# Patient Record
Sex: FEMALE | Race: WHITE | NOT HISPANIC OR LATINO | Employment: FULL TIME | URBAN - NONMETROPOLITAN AREA
[De-identification: names, ages, dates, MRNs, and addresses within clinical notes are randomized per-mention and may not be internally consistent; named-entity substitution may affect disease eponyms.]

---
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
## 2020-02-26 ENCOUNTER — OFFICE VISIT (OUTPATIENT)
Dept: FAMILY MEDICINE CLINIC | Facility: CLINIC | Age: 20
End: 2020-02-26

## 2020-02-26 ENCOUNTER — LAB (OUTPATIENT)
Dept: LAB | Facility: OTHER | Age: 20
End: 2020-02-26

## 2020-02-26 VITALS
DIASTOLIC BLOOD PRESSURE: 68 MMHG | OXYGEN SATURATION: 98 % | WEIGHT: 111.6 LBS | HEIGHT: 64 IN | HEART RATE: 89 BPM | BODY MASS INDEX: 19.05 KG/M2 | SYSTOLIC BLOOD PRESSURE: 116 MMHG | TEMPERATURE: 98.6 F

## 2020-02-26 DIAGNOSIS — R21 RASH: ICD-10-CM

## 2020-02-26 DIAGNOSIS — R19.7 DIARRHEA, UNSPECIFIED TYPE: ICD-10-CM

## 2020-02-26 DIAGNOSIS — R51.9 HEADACHE DISORDER: Primary | ICD-10-CM

## 2020-02-26 LAB
ALBUMIN SERPL-MCNC: 4.3 G/DL (ref 3.5–5)
ALBUMIN/GLOB SERPL: 1.3 G/DL (ref 1.1–1.8)
ALP SERPL-CCNC: 70 U/L (ref 38–126)
ALT SERPL W P-5'-P-CCNC: 12 U/L
ANION GAP SERPL CALCULATED.3IONS-SCNC: 13 MMOL/L (ref 5–15)
AST SERPL-CCNC: 18 U/L (ref 14–36)
BASOPHILS # BLD AUTO: 0.03 10*3/MM3 (ref 0–0.2)
BASOPHILS NFR BLD AUTO: 0.5 % (ref 0–1.5)
BILIRUB SERPL-MCNC: 0.5 MG/DL (ref 0.2–1.3)
BUN BLD-MCNC: 9 MG/DL (ref 7–23)
BUN/CREAT SERPL: 11.3 (ref 7–25)
CALCIUM SPEC-SCNC: 9.6 MG/DL (ref 8.4–10.2)
CHLORIDE SERPL-SCNC: 107 MMOL/L (ref 101–112)
CO2 SERPL-SCNC: 23 MMOL/L (ref 22–30)
CREAT BLD-MCNC: 0.8 MG/DL (ref 0.52–1.04)
DEPRECATED RDW RBC AUTO: 39.8 FL (ref 37–54)
EOSINOPHIL # BLD AUTO: 0.09 10*3/MM3 (ref 0–0.4)
EOSINOPHIL NFR BLD AUTO: 1.4 % (ref 0.3–6.2)
ERYTHROCYTE [DISTWIDTH] IN BLOOD BY AUTOMATED COUNT: 11.9 % (ref 12.3–15.4)
ERYTHROCYTE [SEDIMENTATION RATE] IN BLOOD: 7 MM/HR (ref 0–20)
GFR SERPL CREATININE-BSD FRML MDRD: 92 ML/MIN/1.73 (ref 71–165)
GLOBULIN UR ELPH-MCNC: 3.4 GM/DL (ref 2.3–3.5)
GLUCOSE BLD-MCNC: 87 MG/DL (ref 70–99)
HCT VFR BLD AUTO: 38.9 % (ref 34–46.6)
HGB BLD-MCNC: 13 G/DL (ref 12–15.9)
LYMPHOCYTES # BLD AUTO: 2.25 10*3/MM3 (ref 0.7–3.1)
LYMPHOCYTES NFR BLD AUTO: 34.3 % (ref 19.6–45.3)
MCH RBC QN AUTO: 31.1 PG (ref 26.6–33)
MCHC RBC AUTO-ENTMCNC: 33.4 G/DL (ref 31.5–35.7)
MCV RBC AUTO: 93.1 FL (ref 79–97)
MONOCYTES # BLD AUTO: 0.68 10*3/MM3 (ref 0.1–0.9)
MONOCYTES NFR BLD AUTO: 10.4 % (ref 5–12)
NEUTROPHILS # BLD AUTO: 3.51 10*3/MM3 (ref 1.7–7)
NEUTROPHILS NFR BLD AUTO: 53.4 % (ref 42.7–76)
PLATELET # BLD AUTO: 254 10*3/MM3 (ref 140–450)
PMV BLD AUTO: 10.5 FL (ref 6–12)
POTASSIUM BLD-SCNC: 3.7 MMOL/L (ref 3.4–5)
PROT SERPL-MCNC: 7.7 G/DL (ref 6.3–8.6)
RBC # BLD AUTO: 4.18 10*6/MM3 (ref 3.77–5.28)
RHEUMATOID FACT SERPL-ACNC: NEGATIVE [IU]/ML
SODIUM BLD-SCNC: 143 MMOL/L (ref 137–145)
WBC NRBC COR # BLD: 6.56 10*3/MM3 (ref 3.4–10.8)

## 2020-02-26 PROCEDURE — 86003 ALLG SPEC IGE CRUDE XTRC EA: CPT | Performed by: FAMILY MEDICINE

## 2020-02-26 PROCEDURE — 82784 ASSAY IGA/IGD/IGG/IGM EACH: CPT | Performed by: FAMILY MEDICINE

## 2020-02-26 PROCEDURE — 82306 VITAMIN D 25 HYDROXY: CPT | Performed by: FAMILY MEDICINE

## 2020-02-26 PROCEDURE — 86038 ANTINUCLEAR ANTIBODIES: CPT | Performed by: FAMILY MEDICINE

## 2020-02-26 PROCEDURE — 85025 COMPLETE CBC W/AUTO DIFF WBC: CPT | Performed by: FAMILY MEDICINE

## 2020-02-26 PROCEDURE — 86255 FLUORESCENT ANTIBODY SCREEN: CPT | Performed by: FAMILY MEDICINE

## 2020-02-26 PROCEDURE — 86008 ALLG SPEC IGE RECOMB EA: CPT | Performed by: FAMILY MEDICINE

## 2020-02-26 PROCEDURE — 83516 IMMUNOASSAY NONANTIBODY: CPT | Performed by: FAMILY MEDICINE

## 2020-02-26 PROCEDURE — 85651 RBC SED RATE NONAUTOMATED: CPT | Performed by: FAMILY MEDICINE

## 2020-02-26 PROCEDURE — 80053 COMPREHEN METABOLIC PANEL: CPT | Performed by: FAMILY MEDICINE

## 2020-02-26 PROCEDURE — 99213 OFFICE O/P EST LOW 20 MIN: CPT | Performed by: FAMILY MEDICINE

## 2020-02-26 PROCEDURE — 86430 RHEUMATOID FACTOR TEST QUAL: CPT | Performed by: FAMILY MEDICINE

## 2020-02-26 NOTE — PROGRESS NOTES
Subjective   Mercy Keenan is a 19 y.o. female who presents to the office to establish care.  Her mom is with her today.  She has a few concerns.  First of all she has been having some GI issues.  For 3 to 4 weeks every time she eats she has diarrhea.  It is happened off and on for several years back into high school even and mother has been aware of it.  She is lost some weight however in the last month and the diarrhea has been interfering with her life.    She had a rash that is come up from time to time on her face across the nose and onto the cheeks and the nasal bridge.  Sometimes she has a rash on the skin and has had some joint pain and fatigue.  Mother is concerned about lupus.    She also has headaches that occur about at least 3 of 7 days weekly.  There is no photophobia nausea or vomiting they are achy and generalized in all over.  They are worse with stress.    Diarrhea    This is a recurrent problem. The current episode started 1 to 4 weeks ago. The problem occurs 2 to 4 times per day. The problem has been gradually worsening. The stool consistency is described as watery. The patient states that diarrhea does not awaken her from sleep. Associated symptoms include abdominal pain, bloating, headaches and weight loss. Pertinent negatives include no myalgias. The symptoms are aggravated by stress (eating). There are no known risk factors. She has tried change of diet for the symptoms. The treatment provided no relief.        The following portions of the patient's history were reviewed and updated as appropriate: allergies, current medications, past family history, past medical history, past social history, past surgical history and problem list.    Review of Systems   Constitutional: Positive for weight loss.   Respiratory: Negative.  Negative for shortness of breath.    Cardiovascular: Negative.  Negative for chest pain.   Gastrointestinal: Positive for abdominal pain, bloating and diarrhea.  "  Musculoskeletal: Negative.  Negative for myalgias.   Skin: Negative.    Allergic/Immunologic: Negative for immunocompromised state.   Neurological: Positive for headaches. Negative for dizziness, tremors, seizures, syncope, weakness and numbness.   Hematological: Negative.    Psychiatric/Behavioral: Negative for agitation, confusion, dysphoric mood and sleep disturbance. The patient is not nervous/anxious.    All other systems reviewed and are negative.      Objective    Vitals:    02/26/20 0856   BP: 116/68   Pulse: 89   Temp: 98.6 °F (37 °C)   TempSrc: Oral   SpO2: 98%   Weight: 50.6 kg (111 lb 9.6 oz)   Height: 162.6 cm (64\")     Body mass index is 19.16 kg/m².    Physical Exam   Constitutional: She is oriented to person, place, and time. She appears well-developed and well-nourished.   HENT:   Head: Normocephalic and atraumatic.   Nose: Nose normal.   Mouth/Throat: Oropharynx is clear and moist.   Eyes: Pupils are equal, round, and reactive to light. Conjunctivae and EOM are normal.   Neck: Normal range of motion. Neck supple. No JVD present. No tracheal deviation present. No thyromegaly present.   Cardiovascular: Normal rate, regular rhythm, normal heart sounds and intact distal pulses.   No murmur heard.  Pulmonary/Chest: Effort normal and breath sounds normal. She has no wheezes.   Abdominal: Soft. Bowel sounds are normal. She exhibits no distension. There is no tenderness.   Musculoskeletal: Normal range of motion. She exhibits no edema.   Lymphadenopathy:     She has no cervical adenopathy.   Neurological: She is alert and oriented to person, place, and time. Coordination normal.   Skin: Skin is warm and dry. No rash noted.   Psychiatric: She has a normal mood and affect.   Nursing note and vitals reviewed.      Assessment/Plan   Mercy was seen today for establish care.    Diagnoses and all orders for this visit:    Headache disorder    Diarrhea, unspecified type  -     Allergen Food Panel; Future  -   "   Alpha - Gal Panel; Future  -     Celiac Comprehensive Panel; Future  -     Comprehensive Metabolic Panel  -     CBC & Differential; Future  -     Vitamin D 25 Hydroxy    Rash  -     Rheumatoid Factor  -     Sedimentation Rate  -     NINA; Future    Due to the rash and fatigue we will get labs as above including NINA sed rate and rheumatoid factor    Suspect headaches are tension in nature.  She has been under a tremendous amount of stress.  Start magnesium 400 mg daily, and recommend 2 extra trying Tylenol when needed for the headache.  If they change in nature become more frequent or more severe let me know and we will consider CT or further neurologic work-up.    Strongly suspect she has irritable bowel.  Runs in her family with her mother and uncle with it.  We will get labs however to rule out a malabsorption or gastrointestinal intolerance or allergy.  Recommend probiotics and some diet changes and see me back for follow-up on the labs are done in approximately 2 weeks.  If still consistent with irritable bowel syndrome may need to consider treatment for anxiety.        This document has been electronically signed by Betty Gautam MD on February 26, 2020 4:49 PM

## 2020-02-27 LAB — 25(OH)D3 SERPL-MCNC: 43.1 NG/ML (ref 30–100)

## 2020-02-28 LAB — ANA SER QL: NEGATIVE

## 2020-02-29 LAB
ENDOMYSIUM IGA SER QL: POSITIVE
GLIADIN PEPTIDE IGA SER-ACNC: 20 UNITS (ref 0–19)
GLIADIN PEPTIDE IGG SER-ACNC: 27 UNITS (ref 0–19)
IGA SERPL-MCNC: 146 MG/DL (ref 87–352)
TTG IGA SER-ACNC: 18 U/ML (ref 0–3)
TTG IGG SER-ACNC: 8 U/ML (ref 0–5)

## 2020-03-01 LAB
BARLEY IGE QN: <0.1 KU/L
BEEF IGE QN: <0.1 KU/L
CABBAGE IGE QN: <0.1 KU/L
CARROT IGE QN: <0.1 KU/L
CHICKEN MEAT IGE QN: <0.1 KU/L
CODFISH IGE QN: <0.1 KU/L
CONV CLASS DESCRIPTION: NORMAL
CORN IGE QN: <0.1 KU/L
COW MILK IGE QN: <0.1 KU/L
CRAB IGE QN: <0.1 KU/L
EGG WHITE IGE QN: <0.1 KU/L
GRAPE IGE QN: <0.1 KU/L
GREEN PEPPER IGE QN: <0.1 KU/L
LETTUCE IGE QN: <0.1 KU/L
OAT IGE QN: <0.1 KU/L
ORANGE IGE QN: <0.1 KU/L
PEANUT IGE QN: <0.1 KU/L
PORK IGE QN: <0.1 KU/L
POTATO IGE QN: <0.1 KU/L
RICE IGE QN: <0.1 KU/L
RYE IGE: <0.1 KU/L
SHRIMP IGE: <0.1 KU/L
SOYBEAN IGE QN: <0.1 KU/L
TOMATO IGE QN: <0.1 KU/L
TUNA IGE QN: <0.1 KU/L
WHEAT IGE QN: <0.1 KU/L
WHITE BEAN IGE QN: <0.1 KU/L

## 2020-03-03 LAB
ALPHA GAL IGE: <0.1 KU/L
BEEF IGE QN: <0.1 KU/L
LAMB IGE QN: <0.1 KU/L
Lab: 0
PORK IGE: <0.1 KU/L

## 2020-03-20 RX ORDER — OMEPRAZOLE 40 MG/1
40 CAPSULE, DELAYED RELEASE ORAL DAILY
Qty: 30 CAPSULE | Refills: 5 | Status: SHIPPED | OUTPATIENT
Start: 2020-03-20 | End: 2020-08-24

## 2020-03-25 ENCOUNTER — OFFICE VISIT (OUTPATIENT)
Dept: FAMILY MEDICINE CLINIC | Facility: CLINIC | Age: 20
End: 2020-03-25

## 2020-03-25 VITALS — BODY MASS INDEX: 18.95 KG/M2 | WEIGHT: 111 LBS | HEIGHT: 64 IN

## 2020-03-25 DIAGNOSIS — K90.41: Primary | ICD-10-CM

## 2020-03-25 PROCEDURE — 99213 OFFICE O/P EST LOW 20 MIN: CPT | Performed by: FAMILY MEDICINE

## 2020-03-25 NOTE — PROGRESS NOTES
"Subjective   Mercy Patti Keenan is a 19 y.o. female who presents to the office today for follow-up on diarrhea and GI issues.  Recent testing showed gluten intolerance.  She has been following a gluten-free diet for couple weeks and is starting to have some improvement.  She has less diarrhea and cramping overall.    Diarrhea    This is a recurrent problem. The current episode started 1 to 4 weeks ago. The problem occurs 2 to 4 times per day. The problem has been gradually worsening. The stool consistency is described as watery. The patient states that diarrhea does not awaken her from sleep. Associated symptoms include abdominal pain, bloating, headaches and weight loss. Pertinent negatives include no myalgias. The symptoms are aggravated by stress (eating). There are no known risk factors. She has tried change of diet for the symptoms. The treatment provided no relief.        The following portions of the patient's history were reviewed and updated as appropriate: allergies, current medications, past family history, past medical history, past social history, past surgical history and problem list.    Review of Systems   Constitutional: Positive for weight loss.   Respiratory: Negative.  Negative for shortness of breath.    Cardiovascular: Negative.  Negative for chest pain.   Gastrointestinal: Positive for abdominal pain, bloating and diarrhea.   Musculoskeletal: Negative.  Negative for myalgias.   Skin: Negative.    Allergic/Immunologic: Negative for immunocompromised state.   Neurological: Positive for headaches. Negative for dizziness, tremors, seizures, syncope, weakness and numbness.   Hematological: Negative.    Psychiatric/Behavioral: Negative for agitation, confusion, dysphoric mood and sleep disturbance. The patient is not nervous/anxious.    All other systems reviewed and are negative.      Objective    Vitals:    03/25/20 0759   Weight: 50.3 kg (111 lb)   Height: 162.6 cm (64\")     Body mass index " is 19.05 kg/m².    Physical Exam   Constitutional: She is oriented to person, place, and time. She appears well-developed and well-nourished.   HENT:   Head: Normocephalic and atraumatic.   Nose: Nose normal.   Mouth/Throat: Oropharynx is clear and moist.   Eyes: Pupils are equal, round, and reactive to light. Conjunctivae and EOM are normal.   Neck: Normal range of motion. Neck supple. No JVD present. No tracheal deviation present. No thyromegaly present.   Cardiovascular: Normal rate, regular rhythm, normal heart sounds and intact distal pulses.   No murmur heard.  Pulmonary/Chest: Effort normal and breath sounds normal. She has no wheezes.   Abdominal: Soft. Bowel sounds are normal. She exhibits no distension. There is no tenderness.   Musculoskeletal: Normal range of motion. She exhibits no edema.   Lymphadenopathy:     She has no cervical adenopathy.   Neurological: She is alert and oriented to person, place, and time. Coordination normal.   Skin: Skin is warm and dry. No rash noted.   Psychiatric: She has a normal mood and affect.   Nursing note and vitals reviewed.    Lab on 02/26/2020   Component Date Value Ref Range Status   • Class Description 02/26/2020 Comment   Final        Levels of Specific IgE       Class  Description of Class      ---------------------------  -----  --------------------                     < 0.10         0         Negative             0.10 -    0.31         0/I       Equivocal/Low             0.32 -    0.55         I         Low             0.56 -    1.40         II        Moderate             1.41 -    3.90         III       High             3.91 -   19.00         IV        Very High            19.01 -  100.00         V         Very High                    >100.00         VI        Very High   • Egg White 02/26/2020 <0.10  Class 0 kU/L Final   • Milk, Cow's 02/26/2020 <0.10  Class 0 kU/L Final   • CodFish 02/26/2020 <0.10  Class 0 kU/L Final   • Wheat 02/26/2020 <0.10  Class 0 kU/L  Final   • [place] 02/26/2020 <0.10  Class 0 kU/L Final   • Barley 02/26/2020 <0.10  Class 0 kU/L Final   • Oats 02/26/2020 <0.10  Class 0 kU/L Final   • Corn 02/26/2020 <0.10  Class 0 kU/L Final   • Rice 02/26/2020 <0.10  Class 0 kU/L Final   • Peanut 02/26/2020 <0.10  Class 0 kU/L Final   • Soybean 02/26/2020 <0.10  Class 0 kU/L Final   • White Bean 02/26/2020 <0.10  Class 0 kU/L Final   • Crab 02/26/2020 <0.10  Class 0 kU/L Final   • Shrimp 02/26/2020 <0.10  Class 0 kU/L Final   • Tomato 02/26/2020 <0.10  Class 0 kU/L Final   • Pork 02/26/2020 <0.10  Class 0 kU/L Final   • Beef 02/26/2020 <0.10  Class 0 kU/L Final   • Carrot 02/26/2020 <0.10  Class 0 kU/L Final   • Orange 02/26/2020 <0.10  Class 0 kU/L Final   • Potato 02/26/2020 <0.10  Class 0 kU/L Final   • Tuna 02/26/2020 <0.10  Class 0 kU/L Final   • Chicken 02/26/2020 <0.10  Class 0 kU/L Final   • Green Bell Pepper 02/26/2020 <0.10  Class 0 kU/L Final   • Lettuce 02/26/2020 <0.10  Class 0 kU/L Final   • Cabbage 02/26/2020 <0.10  Class 0 kU/L Final   • Grape 02/26/2020 <0.10  Class 0 kU/L Final   • Beef 02/26/2020 <0.10  <0.35 kU/L Final   • Class Description 02/26/2020 0   Final   • Mart 02/26/2020 <0.10  <0.35 kU/L Final   • Class Interpretation 02/26/2020 0   Final   • Pork 02/26/2020 <0.10  <0.35 kU/L Final   • Class Interpretation 02/26/2020 0   Final    The test method is the [email or web address] ImmunoCAP allergen-specific  IgE system. CLASS INTERPRETATION   <0.10 kU/L= 0,  Negative; 0.10 - 0.34 kU/L= 0/1, Equivocal/Borderline;  0.35 - 0.69  kU/L=1, Low Positive; 0.70 - 3.49 kU/L=2,  Moderate Positive;  3.50  - 17.49 kU/L=3, High Positive;  17.50 - 49.99 kU/L= 4, Very High Positive; 50.00 - 99.99  kU/L= 5, Very High Positive;   >99.99 kU/L=6, Very High  Positive  *This test was developed and its performance  characteristics determined by [facility]. It has not  been cleared or approved by the [place] Food and Drug  Administration.   • Alpha Gal IgE 02/26/2020 <0.10   <0.10 kU/L Final    Previous reports (RAY [id number]) have demonstrated  that patients with IgE antibodies to  [id number],3-galactose are at risk for delayed  anaphylaxis, angioedema, or urticaria following  consumption of beef, pork, or lamb.   • Gliadin Deamidated Peptide Ab, IgA 02/26/2020 20* 0 - 19 units Final                       Negative                   0 - 19                     Weak Positive             20 - 30                     Moderate to Strong Positive   >30   • Deaminated Gliadin Ab IgG 02/26/2020 27* 0 - 19 units Final                       Negative                   0 - 19                     Weak Positive             20 - 30                     Moderate to Strong Positive   >30   • Tissue Transglutaminase IgA 02/26/2020 18* 0 - 3 U/mL Final                                  Negative        0 -  3                                Weak Positive   4 - 10                                Positive           >10   Tissue Transglutaminase (tTG) has been identified   as the endomysial antigen.  Studies have demonstr-   ated that endomysial IgA antibodies have over 99%   specificity for gluten sensitive enteropathy.   • Tissue Transglutaminase IgG 02/26/2020 8* 0 - 5 U/mL Final                                  Negative        0 - 5                                Weak Positive   6 - 9                                Positive           >9   • Endomysial IgA 02/26/2020 Positive* Negative Final   • IgA 02/26/2020 146  87 - 352 mg/dL Final   • NINA Direct 02/26/2020 Negative  Negative Final   • WBC 02/26/2020 6.56  3.40 - 10.80 10*3/mm3 Final   • RBC 02/26/2020 4.18  3.77 - 5.28 10*6/mm3 Final   • Hemoglobin 02/26/2020 13.0  12.0 - 15.9 g/dL Final   • Hematocrit 02/26/2020 38.9  34.0 - 46.6 % Final   • MCV 02/26/2020 93.1  79.0 - 97.0 fL Final   • MCH 02/26/2020 31.1  26.6 - 33.0 pg Final   • MCHC 02/26/2020 33.4  31.5 - 35.7 g/dL Final   • RDW 02/26/2020 11.9* 12.3 - 15.4 % Final   • RDW-SD 02/26/2020  39.8  37.0 - 54.0 fl Final   • MPV 02/26/2020 10.5  6.0 - 12.0 fL Final   • Platelets 02/26/2020 254  140 - 450 10*3/mm3 Final   • Neutrophil % 02/26/2020 53.4  42.7 - 76.0 % Final   • Lymphocyte % 02/26/2020 34.3  19.6 - 45.3 % Final   • Monocyte % 02/26/2020 10.4  5.0 - 12.0 % Final   • Eosinophil % 02/26/2020 1.4  0.3 - 6.2 % Final   • Basophil % 02/26/2020 0.5  0.0 - 1.5 % Final   • Neutrophils, Absolute 02/26/2020 3.51  1.70 - 7.00 10*3/mm3 Final   • Lymphocytes, Absolute 02/26/2020 2.25  0.70 - 3.10 10*3/mm3 Final   • Monocytes, Absolute 02/26/2020 0.68  0.10 - 0.90 10*3/mm3 Final   • Eosinophils, Absolute 02/26/2020 0.09  0.00 - 0.40 10*3/mm3 Final   • Basophils, Absolute 02/26/2020 0.03  0.00 - 0.20 10*3/mm3 Final   Office Visit on 02/26/2020   Component Date Value Ref Range Status   • Rheumatoid Factor Qualitative 02/26/2020 Negative  Negative Final   • Sed Rate 02/26/2020 7  0 - 20 mm/hr Final   • Glucose 02/26/2020 87  70 - 99 mg/dL Final   • BUN 02/26/2020 9  7 - 23 mg/dL Final   • Creatinine 02/26/2020 0.80  0.52 - 1.04 mg/dL Final   • Sodium 02/26/2020 143  137 - 145 mmol/L Final   • Potassium 02/26/2020 3.7  3.4 - 5.0 mmol/L Final   • Chloride 02/26/2020 107  101 - 112 mmol/L Final   • CO2 02/26/2020 23.0  22.0 - 30.0 mmol/L Final   • Calcium 02/26/2020 9.6  8.4 - 10.2 mg/dL Final   • Total Protein 02/26/2020 7.7  6.3 - 8.6 g/dL Final   • Albumin 02/26/2020 4.30  3.50 - 5.00 g/dL Final   • ALT (SGPT) 02/26/2020 12  <=35 U/L Final   • AST (SGOT) 02/26/2020 18  14 - 36 U/L Final   • Alkaline Phosphatase 02/26/2020 70  38 - 126 U/L Final   • Total Bilirubin 02/26/2020 0.5  0.2 - 1.3 mg/dL Final   • eGFR Non African Amer 02/26/2020 92  71 - 165 mL/min/1.73 Final   • Globulin 02/26/2020 3.4  2.3 - 3.5 gm/dL Final   • A/G Ratio 02/26/2020 1.3  1.1 - 1.8 g/dL Final   • BUN/Creatinine Ratio 02/26/2020 11.3  7.0 - 25.0 Final   • Anion Gap 02/26/2020 13.0  5.0 - 15.0 mmol/L Final   • 25 Hydroxy, Vitamin D  02/26/2020 43.1  30.0 - 100.0 ng/ml Final     Assessment/Plan   Mercy was seen today for follow-up.    Diagnoses and all orders for this visit:    Gluten-induced enteropathy    Reviewed gluten-free diet issues with her.  Right now she is feeling like symptoms are gradually improving since she has been following the gluten-free diet.  Advised to continue this and if symptoms recur consider referral to GI for endoscopy.  Reviewed labs, as above.  Recheck in 3 months.        This document has been electronically signed by Betty Gautam MD on March 25, 2020 08:12

## 2020-04-15 RX ORDER — FLUOXETINE HYDROCHLORIDE 20 MG/1
20 CAPSULE ORAL DAILY
Qty: 30 CAPSULE | Refills: 5 | Status: SHIPPED | OUTPATIENT
Start: 2020-04-15 | End: 2020-07-14 | Stop reason: DRUGHIGH

## 2020-04-16 DIAGNOSIS — R10.84 GENERALIZED ABDOMINAL PAIN: Primary | ICD-10-CM

## 2020-05-01 ENCOUNTER — OFFICE VISIT (OUTPATIENT)
Dept: GASTROENTEROLOGY | Facility: CLINIC | Age: 20
End: 2020-05-01

## 2020-05-01 VITALS
DIASTOLIC BLOOD PRESSURE: 80 MMHG | BODY MASS INDEX: 18.37 KG/M2 | HEART RATE: 76 BPM | WEIGHT: 107.6 LBS | HEIGHT: 64 IN | SYSTOLIC BLOOD PRESSURE: 117 MMHG

## 2020-05-01 DIAGNOSIS — R10.13 EPIGASTRIC PAIN: ICD-10-CM

## 2020-05-01 DIAGNOSIS — R19.4 CHANGE IN BOWEL HABITS: Primary | ICD-10-CM

## 2020-05-01 PROCEDURE — 99204 OFFICE O/P NEW MOD 45 MIN: CPT | Performed by: INTERNAL MEDICINE

## 2020-05-01 RX ORDER — DEXTROSE AND SODIUM CHLORIDE 5; .45 G/100ML; G/100ML
30 INJECTION, SOLUTION INTRAVENOUS CONTINUOUS PRN
Status: CANCELLED | OUTPATIENT
Start: 2020-05-08

## 2020-05-01 NOTE — PROGRESS NOTES
[facility]      Chief Complaint:   Chief Complaint   Patient presents with   • Abdominal Pain   • Diarrhea   • Celiac Disease       Subjective     HPI:   Patient with recent change in bowel habits severe nature.  Patient states she has been having diarrhea and feeling as though food is going straight through after she eats.  Patient with some epigastric abdominal pain with eating too.  Patient denies seeing any blood or mucus in the stool.  Patient does not have a family history of GI problems patient's mother does have a history of colon polyps.  Patient did have a celiac test done which appears to show that she has celiac disease but patient states that despite attempts at avoiding gluten patient continues to have the symptoms somewhat improved but still consistently occurring.    Plan; we will schedule patient for EGD and colonoscopy to evaluate we will do biopsy of the small bowel to determine if there is villous atrophy and if this is the cause for patient's symptoms.    Past Medical History:   Past Medical History:   Diagnosis Date   • Anxiety        Past Surgical History:  Past Surgical History:   Procedure Laterality Date   • ADENOIDECTOMY     • TONSILLECTOMY     • WISDOM TOOTH EXTRACTION  10/2018       Family History:  Family History   Problem Relation Age of Onset   • No Known Problems Mother    • Diabetes Father    • Heart failure Father    • Hypertension Father    • Stroke Father        Social History:   reports that she has never smoked. She has never used smokeless tobacco. She reports that she does not drink alcohol or use drugs.    Medications:   Prior to Admission medications    Medication Sig Start Date End Date Taking? Authorizing Provider   drospirenone-ethinyl estradiol (JO,GIANVI) 3-0.02 MG per tablet Take 1 tablet by mouth Daily. 5/13/19  Yes Madhuri Shoemaker APRN   FLUoxetine (PROzac) 20 MG capsule Take 1 capsule by mouth Daily. 4/15/20  Yes Dain  "MD Betty   omeprazole (priLOSEC) 40 MG capsule Take 1 capsule by mouth Daily. 3/20/20  Yes Betty Gautam MD   polyethylene glycol (GoLYTELY) 236 g solution Starting at noon on day prior to procedure, drink 8 ounces every 30 minutes until all gone or stools are clear. May add flavor packet. 5/1/20   Loc Flores MD       Allergies:  Patient has no known allergies.    ROS:    Review of Systems   Constitutional: Negative for activity change, appetite change, chills, diaphoresis, fatigue, fever and unexpected weight change.   HENT: Negative for sore throat and trouble swallowing.    Respiratory: Negative for shortness of breath.    Gastrointestinal: Positive for abdominal pain, diarrhea and nausea. Negative for abdominal distention, anal bleeding, blood in stool, constipation, rectal pain and vomiting.   Endocrine: Negative for polydipsia, polyphagia and polyuria.   Genitourinary: Negative for difficulty urinating, dysuria, frequency and hematuria.   Musculoskeletal: Negative for arthralgias and myalgias.   Skin: Negative for pallor.   Allergic/Immunologic: Negative for food allergies.   Neurological: Negative for weakness, light-headedness and headaches.   Psychiatric/Behavioral: Negative for behavioral problems.     Objective     Blood pressure 117/80, pulse 76, height 162.6 cm (64\"), weight 48.8 kg (107 lb 9.6 oz), not currently breastfeeding.    Physical Exam   Constitutional: She is oriented to person, place, and time. She appears well-developed and well-nourished. No distress.   HENT:   Head: Normocephalic and atraumatic.   Cardiovascular: Normal rate, regular rhythm, normal heart sounds and intact distal pulses. Exam reveals no gallop and no friction rub.   No murmur heard.  Pulmonary/Chest: Breath sounds normal. No respiratory distress. She has no wheezes. She has no rales. She exhibits no tenderness.   Abdominal: Soft. Bowel sounds are normal. She exhibits no distension and no mass. There is no " tenderness. There is no rebound and no guarding. No hernia.   Musculoskeletal: Normal range of motion. She exhibits no edema.   Neurological: She is alert and oriented to person, place, and time.   Skin: Skin is warm and dry. No rash noted. She is not diaphoretic. No erythema. No pallor.   Psychiatric: She has a normal mood and affect. Her behavior is normal. Judgment and thought content normal.        Assessment/Plan   Mercy was seen today for abdominal pain, diarrhea and celiac disease.    Diagnoses and all orders for this visit:    Change in bowel habits  -     Case Request; Standing  -     Case Request    Epigastric pain  -     Case Request; Standing  -     Case Request    Other orders  -     Follow Anesthesia Guidelines / Standing Orders; Future  -     Obtain Informed Consent; Future  -     polyethylene glycol (GoLYTELY) 236 g solution; Starting at noon on day prior to procedure, drink 8 ounces every 30 minutes until all gone or stools are clear. May add flavor packet.        ESOPHAGOGASTRODUODENOSCOPY (N/A), COLONOSCOPY (N/A)     Diagnosis Plan   1. Change in bowel habits  Case Request    dextrose 5 % and sodium chloride 0.45 % infusion    Case Request   2. Epigastric pain  Case Request    dextrose 5 % and sodium chloride 0.45 % infusion    Case Request       Anticipated Surgical Procedure:  Orders Placed This Encounter   Procedures   • Follow Anesthesia Guidelines / Standing Orders     Standing Status:   Future   • Obtain Informed Consent     Standing Status:   Future     Order Specific Question:   Informed Consent Given For     Answer:   egd and colonoscopy       The risks, benefits, and alternatives of this procedure have been discussed with the patient or the responsible party- the patient understands and agrees to proceed.                                                                Answers for HPI/ROS submitted by the patient on 4/24/2020   Abdominal pain  What is the primary reason for your visit?:  Abdominal Pain  Chronicity: chronic  Onset: more than 1 year ago  Onset quality: sudden  Frequency: constantly  Episode duration: 3 hours  Progression since onset: gradually worsening  Pain location: generalized abdominal region  Pain - numeric: 7/10  Pain quality: cramping  Radiates to: does not radiate  anorexia: No  belching: No  flatus: No  hematochezia: No  melena: No  weight loss: Yes  Aggravated by: eating  Relieved by: bowel movements

## 2020-05-01 NOTE — H&P (VIEW-ONLY)
[facility]      Chief Complaint:   Chief Complaint   Patient presents with   • Abdominal Pain   • Diarrhea   • Celiac Disease       Subjective     HPI:   Patient with recent change in bowel habits severe nature.  Patient states she has been having diarrhea and feeling as though food is going straight through after she eats.  Patient with some epigastric abdominal pain with eating too.  Patient denies seeing any blood or mucus in the stool.  Patient does not have a family history of GI problems patient's mother does have a history of colon polyps.  Patient did have a celiac test done which appears to show that she has celiac disease but patient states that despite attempts at avoiding gluten patient continues to have the symptoms somewhat improved but still consistently occurring.    Plan; we will schedule patient for EGD and colonoscopy to evaluate we will do biopsy of the small bowel to determine if there is villous atrophy and if this is the cause for patient's symptoms.    Past Medical History:   Past Medical History:   Diagnosis Date   • Anxiety        Past Surgical History:  Past Surgical History:   Procedure Laterality Date   • ADENOIDECTOMY     • TONSILLECTOMY     • WISDOM TOOTH EXTRACTION  10/2018       Family History:  Family History   Problem Relation Age of Onset   • No Known Problems Mother    • Diabetes Father    • Heart failure Father    • Hypertension Father    • Stroke Father        Social History:   reports that she has never smoked. She has never used smokeless tobacco. She reports that she does not drink alcohol or use drugs.    Medications:   Prior to Admission medications    Medication Sig Start Date End Date Taking? Authorizing Provider   drospirenone-ethinyl estradiol (JO,GIANVI) 3-0.02 MG per tablet Take 1 tablet by mouth Daily. 5/13/19  Yes Madhuri Shoemaker APRN   FLUoxetine (PROzac) 20 MG capsule Take 1 capsule by mouth Daily. 4/15/20  Yes Dain  "MD Betty   omeprazole (priLOSEC) 40 MG capsule Take 1 capsule by mouth Daily. 3/20/20  Yes Betty Gautam MD   polyethylene glycol (GoLYTELY) 236 g solution Starting at noon on day prior to procedure, drink 8 ounces every 30 minutes until all gone or stools are clear. May add flavor packet. 5/1/20   Loc Flores MD       Allergies:  Patient has no known allergies.    ROS:    Review of Systems   Constitutional: Negative for activity change, appetite change, chills, diaphoresis, fatigue, fever and unexpected weight change.   HENT: Negative for sore throat and trouble swallowing.    Respiratory: Negative for shortness of breath.    Gastrointestinal: Positive for abdominal pain, diarrhea and nausea. Negative for abdominal distention, anal bleeding, blood in stool, constipation, rectal pain and vomiting.   Endocrine: Negative for polydipsia, polyphagia and polyuria.   Genitourinary: Negative for difficulty urinating, dysuria, frequency and hematuria.   Musculoskeletal: Negative for arthralgias and myalgias.   Skin: Negative for pallor.   Allergic/Immunologic: Negative for food allergies.   Neurological: Negative for weakness, light-headedness and headaches.   Psychiatric/Behavioral: Negative for behavioral problems.     Objective     Blood pressure 117/80, pulse 76, height 162.6 cm (64\"), weight 48.8 kg (107 lb 9.6 oz), not currently breastfeeding.    Physical Exam   Constitutional: She is oriented to person, place, and time. She appears well-developed and well-nourished. No distress.   HENT:   Head: Normocephalic and atraumatic.   Cardiovascular: Normal rate, regular rhythm, normal heart sounds and intact distal pulses. Exam reveals no gallop and no friction rub.   No murmur heard.  Pulmonary/Chest: Breath sounds normal. No respiratory distress. She has no wheezes. She has no rales. She exhibits no tenderness.   Abdominal: Soft. Bowel sounds are normal. She exhibits no distension and no mass. There is no " tenderness. There is no rebound and no guarding. No hernia.   Musculoskeletal: Normal range of motion. She exhibits no edema.   Neurological: She is alert and oriented to person, place, and time.   Skin: Skin is warm and dry. No rash noted. She is not diaphoretic. No erythema. No pallor.   Psychiatric: She has a normal mood and affect. Her behavior is normal. Judgment and thought content normal.        Assessment/Plan   Mercy was seen today for abdominal pain, diarrhea and celiac disease.    Diagnoses and all orders for this visit:    Change in bowel habits  -     Case Request; Standing  -     Case Request    Epigastric pain  -     Case Request; Standing  -     Case Request    Other orders  -     Follow Anesthesia Guidelines / Standing Orders; Future  -     Obtain Informed Consent; Future  -     polyethylene glycol (GoLYTELY) 236 g solution; Starting at noon on day prior to procedure, drink 8 ounces every 30 minutes until all gone or stools are clear. May add flavor packet.        ESOPHAGOGASTRODUODENOSCOPY (N/A), COLONOSCOPY (N/A)     Diagnosis Plan   1. Change in bowel habits  Case Request    dextrose 5 % and sodium chloride 0.45 % infusion    Case Request   2. Epigastric pain  Case Request    dextrose 5 % and sodium chloride 0.45 % infusion    Case Request       Anticipated Surgical Procedure:  Orders Placed This Encounter   Procedures   • Follow Anesthesia Guidelines / Standing Orders     Standing Status:   Future   • Obtain Informed Consent     Standing Status:   Future     Order Specific Question:   Informed Consent Given For     Answer:   egd and colonoscopy       The risks, benefits, and alternatives of this procedure have been discussed with the patient or the responsible party- the patient understands and agrees to proceed.                                                                Answers for HPI/ROS submitted by the patient on 4/24/2020   Abdominal pain  What is the primary reason for your visit?:  Abdominal Pain  Chronicity: chronic  Onset: more than 1 year ago  Onset quality: sudden  Frequency: constantly  Episode duration: 3 hours  Progression since onset: gradually worsening  Pain location: generalized abdominal region  Pain - numeric: 7/10  Pain quality: cramping  Radiates to: does not radiate  anorexia: No  belching: No  flatus: No  hematochezia: No  melena: No  weight loss: Yes  Aggravated by: eating  Relieved by: bowel movements

## 2020-05-05 PROCEDURE — U0003 INFECTIOUS AGENT DETECTION BY NUCLEIC ACID (DNA OR RNA); SEVERE ACUTE RESPIRATORY SYNDROME CORONAVIRUS 2 (SARS-COV-2) (CORONAVIRUS DISEASE [COVID-19]), AMPLIFIED PROBE TECHNIQUE, MAKING USE OF HIGH THROUGHPUT TECHNOLOGIES AS DESCRIBED BY CMS-2020-01-R: HCPCS | Performed by: INTERNAL MEDICINE

## 2020-05-07 LAB — SARS-COV-2 RNA RESP QL NAA+PROBE: NOT DETECTED

## 2020-05-08 ENCOUNTER — HOSPITAL ENCOUNTER (OUTPATIENT)
Facility: HOSPITAL | Age: 20
Setting detail: HOSPITAL OUTPATIENT SURGERY
Discharge: HOME | End: 2020-05-08
Attending: INTERNAL MEDICINE | Admitting: INTERNAL MEDICINE

## 2020-05-08 ENCOUNTER — ANESTHESIA (OUTPATIENT)
Dept: PERIOP | Facility: HOSPITAL | Age: 20
End: 2020-05-08

## 2020-05-08 ENCOUNTER — ANESTHESIA EVENT (OUTPATIENT)
Dept: PERIOP | Facility: HOSPITAL | Age: 20
End: 2020-05-08

## 2020-05-08 VITALS
HEIGHT: 55 IN | OXYGEN SATURATION: 98 % | TEMPERATURE: 97.2 F | RESPIRATION RATE: 16 BRPM | HEART RATE: 74 BPM | BODY MASS INDEX: 24.59 KG/M2 | SYSTOLIC BLOOD PRESSURE: 110 MMHG | WEIGHT: 106.26 LBS | DIASTOLIC BLOOD PRESSURE: 56 MMHG

## 2020-05-08 DIAGNOSIS — R10.13 EPIGASTRIC PAIN: ICD-10-CM

## 2020-05-08 DIAGNOSIS — R19.4 CHANGE IN BOWEL HABITS: ICD-10-CM

## 2020-05-08 LAB — B-HCG UR QL: NEGATIVE

## 2020-05-08 PROCEDURE — 25010000002 MIDAZOLAM PER 1 MG: Performed by: NURSE ANESTHETIST, CERTIFIED REGISTERED

## 2020-05-08 PROCEDURE — 88305 TISSUE EXAM BY PATHOLOGIST: CPT | Performed by: INTERNAL MEDICINE

## 2020-05-08 PROCEDURE — 43239 EGD BIOPSY SINGLE/MULTIPLE: CPT | Performed by: INTERNAL MEDICINE

## 2020-05-08 PROCEDURE — 88342 IMHCHEM/IMCYTCHM 1ST ANTB: CPT | Performed by: PATHOLOGY

## 2020-05-08 PROCEDURE — 88305 TISSUE EXAM BY PATHOLOGIST: CPT | Performed by: PATHOLOGY

## 2020-05-08 PROCEDURE — 88342 IMHCHEM/IMCYTCHM 1ST ANTB: CPT | Performed by: INTERNAL MEDICINE

## 2020-05-08 PROCEDURE — 81025 URINE PREGNANCY TEST: CPT | Performed by: INTERNAL MEDICINE

## 2020-05-08 PROCEDURE — 25010000002 PROPOFOL 10 MG/ML EMULSION: Performed by: NURSE ANESTHETIST, CERTIFIED REGISTERED

## 2020-05-08 PROCEDURE — 45380 COLONOSCOPY AND BIOPSY: CPT | Performed by: INTERNAL MEDICINE

## 2020-05-08 RX ORDER — PROPOFOL 10 MG/ML
VIAL (ML) INTRAVENOUS AS NEEDED
Status: DISCONTINUED | OUTPATIENT
Start: 2020-05-08 | End: 2020-05-08 | Stop reason: SURG

## 2020-05-08 RX ORDER — SODIUM CHLORIDE, SODIUM GLUCONATE, SODIUM ACETATE, POTASSIUM CHLORIDE, AND MAGNESIUM CHLORIDE 526; 502; 368; 37; 30 MG/100ML; MG/100ML; MG/100ML; MG/100ML; MG/100ML
INJECTION, SOLUTION INTRAVENOUS CONTINUOUS PRN
Status: DISCONTINUED | OUTPATIENT
Start: 2020-05-08 | End: 2020-05-08 | Stop reason: SURG

## 2020-05-08 RX ORDER — LIDOCAINE HYDROCHLORIDE 20 MG/ML
INJECTION, SOLUTION EPIDURAL; INFILTRATION; INTRACAUDAL; PERINEURAL AS NEEDED
Status: DISCONTINUED | OUTPATIENT
Start: 2020-05-08 | End: 2020-05-08 | Stop reason: SURG

## 2020-05-08 RX ORDER — DEXTROSE AND SODIUM CHLORIDE 5; .45 G/100ML; G/100ML
30 INJECTION, SOLUTION INTRAVENOUS CONTINUOUS PRN
Status: DISCONTINUED | OUTPATIENT
Start: 2020-05-08 | End: 2020-05-08 | Stop reason: HOSPADM

## 2020-05-08 RX ORDER — MIDAZOLAM HYDROCHLORIDE 1 MG/ML
INJECTION INTRAMUSCULAR; INTRAVENOUS AS NEEDED
Status: DISCONTINUED | OUTPATIENT
Start: 2020-05-08 | End: 2020-05-08 | Stop reason: SURG

## 2020-05-08 RX ADMIN — PROPOFOL 30 MG: 10 INJECTION, EMULSION INTRAVENOUS at 10:42

## 2020-05-08 RX ADMIN — PROPOFOL 30 MG: 10 INJECTION, EMULSION INTRAVENOUS at 10:44

## 2020-05-08 RX ADMIN — PROPOFOL 20 MG: 10 INJECTION, EMULSION INTRAVENOUS at 10:50

## 2020-05-08 RX ADMIN — DEXTROSE AND SODIUM CHLORIDE 30 ML/HR: 5; 450 INJECTION, SOLUTION INTRAVENOUS at 09:45

## 2020-05-08 RX ADMIN — SODIUM CHLORIDE, SODIUM GLUCONATE, SODIUM ACETATE, POTASSIUM CHLORIDE, AND MAGNESIUM CHLORIDE: 526; 502; 368; 37; 30 INJECTION, SOLUTION INTRAVENOUS at 10:52

## 2020-05-08 RX ADMIN — MIDAZOLAM HYDROCHLORIDE 2 MG: 2 INJECTION, SOLUTION INTRAMUSCULAR; INTRAVENOUS at 10:37

## 2020-05-08 RX ADMIN — LIDOCAINE HYDROCHLORIDE 50 MG: 20 INJECTION, SOLUTION EPIDURAL; INFILTRATION; INTRACAUDAL; PERINEURAL at 10:39

## 2020-05-08 RX ADMIN — PROPOFOL 30 MG: 10 INJECTION, EMULSION INTRAVENOUS at 10:47

## 2020-05-08 RX ADMIN — PROPOFOL 80 MG: 10 INJECTION, EMULSION INTRAVENOUS at 10:39

## 2020-05-08 NOTE — ANESTHESIA PREPROCEDURE EVALUATION
Anesthesia Evaluation                  Airway   Mallampati: I  Dental      Pulmonary    (-) sleep apnea, not a smoker    ROS comment: Negative patient screen for JARROD    Cardiovascular         Neuro/Psych  (+) psychiatric history Anxiety,     GI/Hepatic/Renal/Endo    (+)  GERD,      Musculoskeletal     Abdominal    Substance History      OB/GYN          Other                      Anesthesia Plan    ASA 1     MAC       Anesthetic plan, all risks, benefits, and alternatives have been provided, discussed and informed consent has been obtained with: patient.

## 2020-05-08 NOTE — ANESTHESIA POSTPROCEDURE EVALUATION
Patient: Mercy Keenan    Procedure Summary     Date:  05/08/20 Room / Location:  [facility] OR 08 / [facility]    Anesthesia Start:  1036 Anesthesia Stop:  1056    Procedures:       ESOPHAGOGASTRODUODENOSCOPY (N/A )      COLONOSCOPY (N/A ) Diagnosis:       Change in bowel habits      Epigastric pain      (Change in bowel habits [R19.4])      (Epigastric pain [R10.13])    Surgeon:  Loc Flores MD Provider:  Mustapha Cline MD    Anesthesia Type:  MAC ASA Status:  1          Anesthesia Type: MAC    Vitals  No vitals data found for the desired time range.          Post Anesthesia Care and Evaluation    Patient location during evaluation: bedside  Patient participation: complete - patient participated  Level of consciousness: awake and awake and alert  Pain score: 0  Pain management: satisfactory to patient  Airway patency: patent  Anesthetic complications: No anesthetic complications  PONV Status: none  Cardiovascular status: acceptable and stable  Respiratory status: acceptable, room air and spontaneous ventilation  Hydration status: acceptable

## 2020-05-12 DIAGNOSIS — Z30.41 ORAL CONTRACEPTIVE PILL SURVEILLANCE: ICD-10-CM

## 2020-05-12 DIAGNOSIS — N92.0 EXCESSIVE AND FREQUENT MENSTRUATION WITH REGULAR CYCLE: ICD-10-CM

## 2020-05-12 LAB
LAB AP CASE REPORT: NORMAL
LAB AP SPECIAL STAINS: NORMAL
PATH REPORT.FINAL DX SPEC: NORMAL
PATH REPORT.GROSS SPEC: NORMAL

## 2020-05-12 RX ORDER — DROSPIRENONE AND ETHINYL ESTRADIOL 0.02-3(28)
KIT ORAL
Qty: 28 TABLET | Refills: 0 | Status: SHIPPED | OUTPATIENT
Start: 2020-05-12 | End: 2020-06-29

## 2020-05-15 ENCOUNTER — OFFICE VISIT (OUTPATIENT)
Dept: GASTROENTEROLOGY | Facility: CLINIC | Age: 20
End: 2020-05-15

## 2020-05-15 VITALS
SYSTOLIC BLOOD PRESSURE: 100 MMHG | HEART RATE: 81 BPM | DIASTOLIC BLOOD PRESSURE: 60 MMHG | HEIGHT: 55 IN | BODY MASS INDEX: 24.95 KG/M2 | OXYGEN SATURATION: 98 % | WEIGHT: 107.8 LBS

## 2020-05-15 DIAGNOSIS — K90.0 CELIAC DISEASE: Primary | ICD-10-CM

## 2020-05-15 PROCEDURE — 99212 OFFICE O/P EST SF 10 MIN: CPT | Performed by: INTERNAL MEDICINE

## 2020-05-15 NOTE — PATIENT INSTRUCTIONS

## 2020-05-15 NOTE — PROGRESS NOTES
[facility]      Chief Complaint:   Chief Complaint   Patient presents with   • Endo Follow Up       Subjective     HPI:   Patient for follow-up on endoscopy.  Patient had EGD and colonoscopy because of weight loss and possibility of celiac disease.  Patient has been gluten-free for many months.  Biopsies of small bowel showed normal villi.  Patient states her bowel movements are now normal although she was having diarrhea prior to starting treatment.  Discussed with patient the importance of continuing a gluten-free diet.    Plan; patient to continue gluten-free diet.discussed with patient that after 6 months she could try going back to a normal diet and see if her bowel habits change although it is very highly likely that it they will.    Past Medical History:   Past Medical History:   Diagnosis Date   • Anxiety        Past Surgical History:  Past Surgical History:   Procedure Laterality Date   • ADENOIDECTOMY     • COLONOSCOPY N/A 5/8/2020    Procedure: COLONOSCOPY;  Surgeon: Loc Flores MD;  Location: [facility];  Service: Gastroenterology;  Laterality: N/A;   • ENDOSCOPY N/A 5/8/2020    Procedure: ESOPHAGOGASTRODUODENOSCOPY;  Surgeon: Loc Flores MD;  Location: [facility];  Service: Gastroenterology;  Laterality: N/A;   • TONSILLECTOMY     • UPPER GASTROINTESTINAL ENDOSCOPY  05/08/2020   • WISDOM TOOTH EXTRACTION  10/2018       Family History:  Family History   Problem Relation Age of Onset   • No Known Problems Mother    • Diabetes Father    • Heart failure Father    • Hypertension Father    • Stroke Father        Social History:   reports that she has never smoked. She has never used smokeless tobacco. She reports that she does not drink alcohol or use drugs.    Medications:   Prior to Admission medications    Medication Sig Start Date End Date Taking? Authorizing Provider   drospirenone-ethinyl estradiol (JO,GIANVI) 3-0.02 MG per tablet TAKE ONE TABLET BY MOUTH DAILY 5/12/20   "Yes Navid Madhuri NASIM Negro   FLUoxetine (PROzac) 20 MG capsule Take 1 capsule by mouth Daily. 4/15/20  Yes Betty Gautam MD   omeprazole (priLOSEC) 40 MG capsule Take 1 capsule by mouth Daily. 3/20/20  Yes Betty Gautam MD       Allergies:  Patient has no known allergies.    ROS:    Review of Systems   Constitutional: Negative for activity change, appetite change, chills, diaphoresis, fatigue, fever and unexpected weight change.   HENT: Negative for sore throat and trouble swallowing.    Respiratory: Negative for shortness of breath.    Gastrointestinal: Negative for abdominal distention, abdominal pain, anal bleeding, blood in stool, constipation, diarrhea, nausea, rectal pain and vomiting.   Endocrine: Negative for polydipsia, polyphagia and polyuria.   Genitourinary: Negative for difficulty urinating.   Musculoskeletal: Negative for arthralgias.   Skin: Negative for pallor.   Allergic/Immunologic: Negative for food allergies.   Neurological: Negative for weakness and light-headedness.   Psychiatric/Behavioral: Negative for behavioral problems.     Objective     Blood pressure 100/60, pulse 81, height 137.2 cm (54\"), weight 48.9 kg (107 lb 12.8 oz), last menstrual period 05/01/2020, SpO2 98 %, not currently breastfeeding.    Physical Exam   Constitutional: She is oriented to person, place, and time. She appears well-developed and well-nourished. No distress.   HENT:   Head: Normocephalic and atraumatic.   Cardiovascular: Normal rate, regular rhythm, normal heart sounds and intact distal pulses. Exam reveals no gallop and no friction rub.   No murmur heard.  Pulmonary/Chest: Breath sounds normal. No respiratory distress. She has no wheezes. She has no rales. She exhibits no tenderness.   Abdominal: Soft. Bowel sounds are normal. She exhibits no distension and no mass. There is no tenderness. There is no rebound and no guarding. No hernia.   Musculoskeletal: Normal range of motion. She exhibits " no edema.   Neurological: She is alert and oriented to person, place, and time.   Skin: Skin is warm and dry. No rash noted. She is not diaphoretic. No erythema. No pallor.   Psychiatric: She has a normal mood and affect. Her behavior is normal. Judgment and thought content normal.        Assessment/Plan   Mercy was seen today for endo follow up.    Diagnoses and all orders for this visit:    Celiac disease        * Surgery not found *     Diagnosis Plan   1. Celiac disease         Anticipated Surgical Procedure:  No orders of the defined types were placed in this encounter.      The risks, benefits, and alternatives of this procedure have been discussed with the patient or the responsible party- the patient understands and agrees to proceed.

## 2020-06-22 DIAGNOSIS — K90.0 CELIAC DISEASE: Primary | ICD-10-CM

## 2020-06-22 DIAGNOSIS — R19.7 DIARRHEA, UNSPECIFIED TYPE: ICD-10-CM

## 2020-06-29 DIAGNOSIS — Z30.41 ORAL CONTRACEPTIVE PILL SURVEILLANCE: ICD-10-CM

## 2020-06-29 DIAGNOSIS — N92.0 EXCESSIVE AND FREQUENT MENSTRUATION WITH REGULAR CYCLE: ICD-10-CM

## 2020-06-29 RX ORDER — DROSPIRENONE AND ETHINYL ESTRADIOL 0.02-3(28)
KIT ORAL
Qty: 28 TABLET | Refills: 0 | Status: SHIPPED | OUTPATIENT
Start: 2020-06-29 | End: 2020-07-08 | Stop reason: SDUPTHER

## 2020-07-08 ENCOUNTER — OFFICE VISIT (OUTPATIENT)
Dept: OBSTETRICS AND GYNECOLOGY | Facility: CLINIC | Age: 20
End: 2020-07-08

## 2020-07-08 ENCOUNTER — LAB (OUTPATIENT)
Dept: LAB | Facility: OTHER | Age: 20
End: 2020-07-08

## 2020-07-08 VITALS
DIASTOLIC BLOOD PRESSURE: 58 MMHG | SYSTOLIC BLOOD PRESSURE: 98 MMHG | BODY MASS INDEX: 24.99 KG/M2 | HEIGHT: 55 IN | HEART RATE: 76 BPM | WEIGHT: 108 LBS

## 2020-07-08 DIAGNOSIS — Z30.41 ORAL CONTRACEPTIVE PILL SURVEILLANCE: Primary | ICD-10-CM

## 2020-07-08 DIAGNOSIS — N92.0 EXCESSIVE AND FREQUENT MENSTRUATION WITH REGULAR CYCLE: ICD-10-CM

## 2020-07-08 DIAGNOSIS — Z11.3 ROUTINE SCREENING FOR STI (SEXUALLY TRANSMITTED INFECTION): ICD-10-CM

## 2020-07-08 PROCEDURE — 87591 N.GONORRHOEAE DNA AMP PROB: CPT | Performed by: NURSE PRACTITIONER

## 2020-07-08 PROCEDURE — 99213 OFFICE O/P EST LOW 20 MIN: CPT | Performed by: NURSE PRACTITIONER

## 2020-07-08 PROCEDURE — 87661 TRICHOMONAS VAGINALIS AMPLIF: CPT | Performed by: NURSE PRACTITIONER

## 2020-07-08 PROCEDURE — 87491 CHLMYD TRACH DNA AMP PROBE: CPT | Performed by: NURSE PRACTITIONER

## 2020-07-08 RX ORDER — DROSPIRENONE AND ETHINYL ESTRADIOL 0.02-3(28)
1 KIT ORAL DAILY
Qty: 28 TABLET | Refills: 12 | Status: SHIPPED | OUTPATIENT
Start: 2020-07-08 | End: 2021-07-07

## 2020-07-08 NOTE — PROGRESS NOTES
Subjective   Mercy Keenan is a 19 y.o. female.     HPI   Pt presents for annual follow up and refills on OCP for frequent menstruation, dysmenorrhea and acne, and contraceptive. Pt states her periods are still managed with this OCP. Regular,4-5 days, light-mod flow, mod-severe cramps. Acne has completely resolved. Patient's last menstrual period was 06/25/2020 (exact date). Pt wishes to continue this OCP.     Pt has had 3 sexual partners in the last year. I encouraged STI testing today. Pt agrees to testing on her urine.      The following portions of the patient's history were reviewed and updated as appropriate: allergies, current medications, past family history, past medical history, past social history, past surgical history and problem list.    Review of Systems   Constitutional: Negative.  Negative for unexpected weight gain and unexpected weight loss.   Respiratory: Negative.    Cardiovascular: Negative.    Gastrointestinal: Negative for nausea.   Genitourinary: Negative.  Negative for dysuria, frequency, genital sores, menstrual problem, pelvic pain, vaginal discharge and vaginal pain.   Skin: Negative.         Acne resolved   Neurological: Negative for headache.       Objective    Vitals:    07/08/20 1435   BP: 98/58   Pulse: 76         07/08/20  1435   Weight: 49 kg (108 lb)     Body mass index is 26.04 kg/m².    Physical Exam   Constitutional: She is oriented to person, place, and time. She appears well-developed and well-nourished.   Cardiovascular: Normal rate, regular rhythm and normal heart sounds.   Pulmonary/Chest: Effort normal and breath sounds normal.   Neurological: She is alert and oriented to person, place, and time.   Skin: Skin is warm and dry.   No acne   Psychiatric: She has a normal mood and affect. Her behavior is normal.   Vitals reviewed.        Assessment/Plan   Mercy was seen today for contraception.    Diagnoses and all orders for this visit:    Oral contraceptive  pill surveillance  -     drospirenone-ethinyl estradiol (JO,GIANVI) 3-0.02 MG per tablet; Take 1 tablet by mouth Daily.    Excessive and frequent menstruation with regular cycle  -     drospirenone-ethinyl estradiol (JO,GIANVI) 3-0.02 MG per tablet; Take 1 tablet by mouth Daily.    Routine screening for STI (sexually transmitted infection)  -     Chlamydia trachomatis, Neisseria gonorrhoeae, Trichomonas vaginalis, PCR - Urine, Urine, Clean Catch      Continue OCP daily as prescribed. Reminded what to do if dose is missed.     I will call with STI testing results and Rx PRN. Encouraged safe sex practices.     RTC annually for refills or sooner PRN.

## 2020-07-09 LAB
C TRACH RRNA CVX QL NAA+PROBE: NEGATIVE
N GONORRHOEA RRNA SPEC QL NAA+PROBE: NEGATIVE
TRICHOMONAS VAGINALIS PCR: NEGATIVE

## 2020-07-14 RX ORDER — FLUOXETINE HYDROCHLORIDE 40 MG/1
40 CAPSULE ORAL DAILY
Qty: 30 CAPSULE | Refills: 5 | Status: SHIPPED | OUTPATIENT
Start: 2020-07-14 | End: 2021-01-06

## 2020-08-11 ENCOUNTER — HOSPITAL ENCOUNTER (EMERGENCY)
Facility: HOSPITAL | Age: 20
Discharge: HOME | End: 2020-08-11
Attending: EMERGENCY MEDICINE | Admitting: EMERGENCY MEDICINE

## 2020-08-11 VITALS
TEMPERATURE: 97.9 F | HEART RATE: 88 BPM | SYSTOLIC BLOOD PRESSURE: 124 MMHG | OXYGEN SATURATION: 98 % | DIASTOLIC BLOOD PRESSURE: 72 MMHG | RESPIRATION RATE: 17 BRPM
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
## 2020-08-11 DIAGNOSIS — S30.0XXA CONTUSION OF COCCYX, INITIAL ENCOUNTER: ICD-10-CM

## 2020-08-11 PROCEDURE — 99283 EMERGENCY DEPT VISIT LOW MDM: CPT

## 2020-08-11 PROCEDURE — 63710000001 ONDANSETRON ODT 4 MG TABLET DISPERSIBLE: Performed by: EMERGENCY MEDICINE

## 2020-08-11 RX ORDER — ONDANSETRON 4 MG/1
4 TABLET, ORALLY DISINTEGRATING ORAL ONCE
Status: COMPLETED | OUTPATIENT
Start: 2020-08-11 | End: 2020-08-11

## 2020-08-11 RX ORDER — ONDANSETRON 4 MG/1
4 TABLET, ORALLY DISINTEGRATING ORAL EVERY 8 HOURS PRN
Qty: 10 TABLET | Refills: 0 | Status: SHIPPED | OUTPATIENT
Start: 2020-08-11 | End: 2021-01-26

## 2020-08-11 RX ORDER — IBUPROFEN 400 MG/1
400 TABLET ORAL ONCE
Status: COMPLETED | OUTPATIENT
Start: 2020-08-11 | End: 2020-08-11

## 2020-08-11 RX ORDER — NAPROXEN 375 MG/1
375 TABLET ORAL 2 TIMES DAILY PRN
Qty: 20 TABLET | Refills: 0 | Status: SHIPPED | OUTPATIENT
Start: 2020-08-11 | End: 2021-01-26

## 2020-08-11 RX ADMIN — IBUPROFEN 400 MG: 400 TABLET, FILM COATED ORAL at 21:32

## 2020-08-11 RX ADMIN — ONDANSETRON 4 MG: 4 TABLET, ORALLY DISINTEGRATING ORAL at 21:31

## 2020-08-12 NOTE — ED PROVIDER NOTES
"Subjective   18yo female presents ED s/p fall from hoverboard landing on sacrum/coccyx.  Pt reports subsequently developed \"shaking\", nausea, mild headache, transient blurred vision.  Pt expressed concern over possible concussion as she had a previous concussion as a child.  Pt denies head trauma/confusion/altered mental status/vomiting/neck pain/back pain/chest pain/abd pain.      History provided by:  Patient  Fall   Mechanism of injury: fall    Injury location:  Pelvis  Associated symptoms: headaches and nausea    Associated symptoms: no abdominal pain        Review of Systems   Constitutional: Negative.    Eyes: Positive for visual disturbance.   Respiratory: Negative.    Cardiovascular: Negative.    Gastrointestinal: Positive for nausea. Negative for abdominal pain.   Genitourinary: Negative.    Musculoskeletal: Negative.    Skin: Negative.    Neurological: Positive for headaches. Negative for syncope.   All other systems reviewed and are negative.      Past Medical History:   Diagnosis Date   • Anxiety        No Known Allergies    Past Surgical History:   Procedure Laterality Date   • ADENOIDECTOMY     • COLONOSCOPY N/A 5/8/2020    Procedure: COLONOSCOPY;  Surgeon: Loc Flores MD;  Location: [facility];  Service: Gastroenterology;  Laterality: N/A;   • ENDOSCOPY N/A 5/8/2020    Procedure: ESOPHAGOGASTRODUODENOSCOPY;  Surgeon: Loc Flores MD;  Location: [facility];  Service: Gastroenterology;  Laterality: N/A;   • TONSILLECTOMY     • UPPER GASTROINTESTINAL ENDOSCOPY  05/08/2020   • WISDOM TOOTH EXTRACTION  10/2018       Family History   Problem Relation Age of Onset   • No Known Problems Mother    • Diabetes Father    • Heart failure Father    • Hypertension Father    • Stroke Father        Social History     Socioeconomic History   • Marital status: Single     Spouse name: Not on file   • Number of children: Not on file   • Years of education: Not on file   • Highest education level: Not on file "   Tobacco Use   • Smoking status: Never Smoker   • Smokeless tobacco: Never Used   Substance and Sexual Activity   • Alcohol use: No     Frequency: Never   • Drug use: No   • Sexual activity: Yes     Partners: Male     Birth control/protection: Pill           Objective   Physical Exam   Constitutional: She is oriented to person, place, and time. She appears well-developed and well-nourished.   HENT:   Head: Normocephalic and atraumatic. Head is without raccoon's eyes and without Garcia's sign.   Right Ear: External ear normal. No hemotympanum.   Left Ear: External ear normal. No hemotympanum.   Nose: Nose normal.   Mouth/Throat: Oropharynx is clear and moist.   Eyes: Pupils are equal, round, and reactive to light. EOM are normal.   Neck: Normal range of motion. Neck supple. No JVD present. No tracheal deviation present.   Cardiovascular: Normal rate, regular rhythm, normal heart sounds and intact distal pulses. Exam reveals no gallop and no friction rub.   No murmur heard.  Pulmonary/Chest: Effort normal and breath sounds normal. She has no wheezes. She has no rales.   Abdominal: Soft. Bowel sounds are normal. She exhibits no distension and no mass. There is no tenderness. There is no rebound and no guarding.   Musculoskeletal: Normal range of motion.        Cervical back: Normal.        Thoracic back: Normal.        Lumbar back: Normal.   Nexus negative.   Lymphadenopathy:     She has no cervical adenopathy.   Neurological: She is alert and oriented to person, place, and time. She has normal strength. No sensory deficit. GCS eye subscore is 4. GCS verbal subscore is 5. GCS motor subscore is 6.   Skin: Skin is warm and dry.   Nursing note and vitals reviewed.      Procedures           ED Course                                           MDM    Final diagnoses:   Fall, initial encounter   Contusion of coccyx, initial encounter            Roderick Soler MD  08/11/20 [id number]

## 2020-08-12 NOTE — ED NOTES
Patient presents to ED with complaint of fall off [employer] board. She states she fell about 1.5 hours ago and landed on her tail bone. She denies hitting her head but felt as if her vision went blurred for 30 minutes after.     Rozina Metcalf RN  08/11/20 2058

## 2020-08-12 NOTE — ED NOTES
Pt presents to the ED with c/o headache after a fall. Pt denies LOC but reports her vision was blurred immediately after falling.      Hailee Braden RN  08/11/20 2051

## 2020-08-24 RX ORDER — OMEPRAZOLE 40 MG/1
40 CAPSULE, DELAYED RELEASE ORAL DAILY
Qty: 30 CAPSULE | Refills: 5 | Status: SHIPPED | OUTPATIENT
Start: 2020-08-24 | End: 2021-02-09

## 2021-01-06 RX ORDER — FLUOXETINE HYDROCHLORIDE 40 MG/1
40 CAPSULE ORAL DAILY
Qty: 30 CAPSULE | Refills: 5 | Status: SHIPPED | OUTPATIENT
Start: 2021-01-06 | End: 2021-06-09

## 2021-02-01 ENCOUNTER — OFFICE VISIT (OUTPATIENT)
Dept: OTOLARYNGOLOGY | Facility: CLINIC | Age: 21
End: 2021-02-01

## 2021-02-01 VITALS — HEIGHT: 64 IN | OXYGEN SATURATION: 99 % | BODY MASS INDEX: 17.24 KG/M2 | WEIGHT: 101 LBS

## 2021-02-01 DIAGNOSIS — S02.2XXA CLOSED FRACTURE OF NASAL BONE, INITIAL ENCOUNTER: Primary | ICD-10-CM

## 2021-02-01 PROCEDURE — 99204 OFFICE O/P NEW MOD 45 MIN: CPT | Performed by: OTOLARYNGOLOGY

## 2021-02-01 NOTE — PROGRESS NOTES
"Subjective   Mercy Keenan is a 20 y.o. female.       History of Present Illness   Patient reports that 6 days ago she was struck in the nose by her dog's head.  Had some modest bleeding.  Reports that her nose does not look significantly deformed to her.  Says she can breathe well through both sides.  Was seen at urgent care and had x-rays that were read as \"Minimal irregularity of the left side of the nasal  bones on the frontal view, age indeterminate in nature\"      The following portions of the patient's history were reviewed and updated as appropriate: allergies, current medications, past family history, past medical history, past social history, past surgical history and problem list.     reports that she has never smoked. She has never used smokeless tobacco. She reports that she does not drink alcohol or use drugs.   Patient is not a tobacco user and has not been counseled for use of tobacco products      Review of Systems        Objective   Physical Exam  Ears: External ears no deformity, canals no discharge, tympanic membranes intact clear and mobile bilaterally.  Nose: Slight tenderness and minimal depression but no overt step-off or crepitus of the left nasal bone.  Right nasal bone appears intact intranasally the septum is to the right with no evidence of hematoma or blood  Oral cavity: No masses or lesions  Pharynx: Tonsils absent no erythema or exudate      Assessment/Plan   Diagnoses and all orders for this visit:    1. Closed fracture of nasal bone, initial encounter (Primary)      Plan: Discussed options with the patient.  Gave her the option of proceeding with attempted closed reduction of nasal fracture under the assumption that the left nasal bone is acutely fractured and slightly depressed.  Explained the nature of the procedure to her including need for general anesthetic and risk of unsatisfactory appearance and bleeding.  However given that she believes her nose is not " significantly deformed also gave her the option of observation.  She prefers observation.  May follow-up with me as needed.

## 2021-02-09 RX ORDER — OMEPRAZOLE 40 MG/1
40 CAPSULE, DELAYED RELEASE ORAL DAILY
Qty: 30 CAPSULE | Refills: 0 | Status: SHIPPED | OUTPATIENT
Start: 2021-02-09 | End: 2021-03-03

## 2021-03-03 RX ORDER — OMEPRAZOLE 40 MG/1
CAPSULE, DELAYED RELEASE ORAL
Qty: 30 CAPSULE | Refills: 0 | Status: SHIPPED | OUTPATIENT
Start: 2021-03-03 | End: 2021-06-21 | Stop reason: SDUPTHER

## 2021-06-09 RX ORDER — FLUOXETINE HYDROCHLORIDE 40 MG/1
CAPSULE ORAL
Qty: 30 CAPSULE | Refills: 0 | Status: SHIPPED | OUTPATIENT
Start: 2021-06-09 | End: 2021-07-21

## 2021-06-21 ENCOUNTER — OFFICE VISIT (OUTPATIENT)
Dept: FAMILY MEDICINE CLINIC | Facility: CLINIC | Age: 21
End: 2021-06-21

## 2021-06-21 VITALS
HEART RATE: 70 BPM | BODY MASS INDEX: 18.2 KG/M2 | OXYGEN SATURATION: 97 % | DIASTOLIC BLOOD PRESSURE: 68 MMHG | HEIGHT: 64 IN | SYSTOLIC BLOOD PRESSURE: 108 MMHG | WEIGHT: 106.6 LBS

## 2021-06-21 DIAGNOSIS — K21.9 GASTROESOPHAGEAL REFLUX DISEASE WITHOUT ESOPHAGITIS: ICD-10-CM

## 2021-06-21 DIAGNOSIS — K90.0 CELIAC DISEASE: ICD-10-CM

## 2021-06-21 DIAGNOSIS — Z02.0 SCHOOL PHYSICAL EXAM: Primary | ICD-10-CM

## 2021-06-21 PROCEDURE — 99395 PREV VISIT EST AGE 18-39: CPT | Performed by: FAMILY MEDICINE

## 2021-06-21 RX ORDER — OMEPRAZOLE 40 MG/1
40 CAPSULE, DELAYED RELEASE ORAL DAILY
Qty: 30 CAPSULE | Refills: 5 | Status: SHIPPED | OUTPATIENT
Start: 2021-06-21 | End: 2021-11-23

## 2021-06-21 NOTE — PROGRESS NOTES
"Subjective   Mercy Patti Keenan is a 20 y.o. female.   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  No immediate concerns, does need a refill of her Prilosec.    History of Present Illness    The following portions of the patient's history were reviewed and updated as appropriate: allergies, current medications, past family history, past medical history, past social history, past surgical history and problem list.    Review of Systems   Respiratory: Negative.  Negative for shortness of breath.    Cardiovascular: Negative.  Negative for chest pain.   Gastrointestinal:        She is gluten intolerant, struggles with not being able to maintain her weight at times due to the diet limitations but is followed regularly by GI.   Musculoskeletal: Negative.  Negative for myalgias.   Skin: Negative.    Allergic/Immunologic: Negative for immunocompromised state.   Neurological: Negative for dizziness, tremors, seizures, syncope, weakness and numbness.   Hematological: Negative.    Psychiatric/Behavioral: Negative for agitation, confusion, dysphoric mood and sleep disturbance. The patient is not nervous/anxious.    All other systems reviewed and are negative.      Objective    Vitals:    06/21/21 0755   BP: 108/68   Pulse: 70   SpO2: 97%   Weight: 48.4 kg (106 lb 9.6 oz)   Height: 162.6 cm (64\")     Body mass index is 18.3 kg/m².    Physical Exam  Vitals and nursing note reviewed.   Constitutional:       Appearance: She is well-developed.   HENT:      Head: Normocephalic and atraumatic.      Nose: Nose normal.   Eyes:      Conjunctiva/sclera: Conjunctivae normal.      Pupils: Pupils are equal, round, and reactive to light.   Neck:      Thyroid: No thyromegaly.      Vascular: No JVD.      Trachea: No tracheal deviation.   Cardiovascular:      Rate and Rhythm: Normal rate and regular rhythm.      Heart sounds: Normal heart sounds. No murmur heard.     Pulmonary:      Effort: Pulmonary effort is normal.      Breath " sounds: Normal breath sounds. No wheezing.   Abdominal:      General: Bowel sounds are normal. There is no distension.      Palpations: Abdomen is soft.      Tenderness: There is no abdominal tenderness.   Musculoskeletal:         General: Normal range of motion.      Cervical back: Normal range of motion and neck supple.   Lymphadenopathy:      Cervical: No cervical adenopathy.   Skin:     General: Skin is warm and dry.      Findings: No rash.   Neurological:      Mental Status: She is alert and oriented to person, place, and time.      Coordination: Coordination normal.         Assessment/Plan   Diagnoses and all orders for this visit:    1. School physical exam (Primary)    2. Celiac disease    3. Gastroesophageal reflux disease without esophagitis  -     omeprazole (priLOSEC) 40 MG capsule; Take 1 capsule by mouth Daily.  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  She will continue on medications as above and follow-up with me as scheduled as well as her gastroenterologist.        This document has been electronically signed by Betty Gautam MD on June 21, 2021 08:30 CDT

## 2021-07-07 DIAGNOSIS — N92.0 EXCESSIVE AND FREQUENT MENSTRUATION WITH REGULAR CYCLE: ICD-10-CM

## 2021-07-07 DIAGNOSIS — Z30.41 ORAL CONTRACEPTIVE PILL SURVEILLANCE: ICD-10-CM

## 2021-07-07 RX ORDER — DROSPIRENONE AND ETHINYL ESTRADIOL 0.02-3(28)
1 KIT ORAL DAILY
Qty: 28 TABLET | Refills: 0 | Status: SHIPPED | OUTPATIENT
Start: 2021-07-07 | End: 2021-07-19 | Stop reason: SDUPTHER

## 2021-07-19 ENCOUNTER — OFFICE VISIT (OUTPATIENT)
Dept: OBSTETRICS AND GYNECOLOGY | Facility: CLINIC | Age: 21
End: 2021-07-19

## 2021-07-19 ENCOUNTER — LAB (OUTPATIENT)
Dept: LAB | Facility: OTHER | Age: 21
End: 2021-07-19

## 2021-07-19 VITALS
HEART RATE: 65 BPM | HEIGHT: 64 IN | DIASTOLIC BLOOD PRESSURE: 68 MMHG | SYSTOLIC BLOOD PRESSURE: 100 MMHG | WEIGHT: 107.8 LBS | BODY MASS INDEX: 18.4 KG/M2

## 2021-07-19 DIAGNOSIS — Z00.00 ROUTINE GENERAL MEDICAL EXAMINATION AT A HEALTH CARE FACILITY: ICD-10-CM

## 2021-07-19 DIAGNOSIS — Z30.41 ORAL CONTRACEPTIVE PILL SURVEILLANCE: Primary | ICD-10-CM

## 2021-07-19 DIAGNOSIS — Z11.3 ROUTINE SCREENING FOR STI (SEXUALLY TRANSMITTED INFECTION): ICD-10-CM

## 2021-07-19 DIAGNOSIS — N92.0 EXCESSIVE AND FREQUENT MENSTRUATION WITH REGULAR CYCLE: ICD-10-CM

## 2021-07-19 DIAGNOSIS — Z00.00 ROUTINE GENERAL MEDICAL EXAMINATION AT A HEALTH CARE FACILITY: Primary | ICD-10-CM

## 2021-07-19 LAB — HBV SURFACE AB SER RIA-ACNC: NORMAL

## 2021-07-19 PROCEDURE — 36415 COLL VENOUS BLD VENIPUNCTURE: CPT | Performed by: FAMILY MEDICINE

## 2021-07-19 PROCEDURE — 87661 TRICHOMONAS VAGINALIS AMPLIF: CPT | Performed by: NURSE PRACTITIONER

## 2021-07-19 PROCEDURE — 99213 OFFICE O/P EST LOW 20 MIN: CPT | Performed by: NURSE PRACTITIONER

## 2021-07-19 PROCEDURE — 87491 CHLMYD TRACH DNA AMP PROBE: CPT | Performed by: NURSE PRACTITIONER

## 2021-07-19 PROCEDURE — 86706 HEP B SURFACE ANTIBODY: CPT | Performed by: FAMILY MEDICINE

## 2021-07-19 PROCEDURE — 87591 N.GONORRHOEAE DNA AMP PROB: CPT | Performed by: NURSE PRACTITIONER

## 2021-07-19 RX ORDER — DROSPIRENONE AND ETHINYL ESTRADIOL 0.02-3(28)
1 KIT ORAL DAILY
Qty: 28 TABLET | Refills: 12 | Status: SHIPPED | OUTPATIENT
Start: 2021-07-19 | End: 2022-07-20 | Stop reason: SDUPTHER

## 2021-07-19 NOTE — PROGRESS NOTES
Subjective   Mercy Keenan is a 20 y.o. female.     HPI   Pt presents for annual follow up and refills on OCP for frequent menstruation, dysmenorrhea and acne, and contraceptive. Pt states her periods are still managed with this OCP. Regular,4-5 days, light flow, mod cramps. Acne has completely resolved. Patient's last menstrual period was 06/25/2021 (exact date). Pt wishes to continue this OCP.     Pt has had 4 sexual partners in the last year. I encouraged STI testing today. Pt agrees to testing on her urine.       The following portions of the patient's history were reviewed and updated as appropriate: allergies, current medications, past family history, past medical history, past social history, past surgical history and problem list.    Review of Systems   Constitutional: Negative.  Negative for unexpected weight gain and unexpected weight loss.   Respiratory: Negative.    Cardiovascular: Negative.    Gastrointestinal: Negative for nausea.   Genitourinary: Negative.  Negative for dysuria, frequency, genital sores, menstrual problem, pelvic pain, vaginal discharge and vaginal pain.   Skin: Negative.         Acne resolved   Neurological: Negative for headache.       Objective    Vitals:    07/19/21 0757   BP: 100/68   Pulse: 65         07/19/21  0757   Weight: 48.9 kg (107 lb 12.8 oz)     Body mass index is 18.5 kg/m².    Physical Exam   Constitutional: She is oriented to person, place, and time. She appears well-developed and well-nourished.   Cardiovascular: Normal rate, regular rhythm and normal heart sounds.   Pulmonary/Chest: Effort normal and breath sounds normal.   Neurological: She is alert and oriented to person, place, and time.   Skin: Skin is warm and dry.   No acne   Psychiatric: Her behavior is normal.   Vitals reviewed.        Assessment/Plan   Diagnoses and all orders for this visit:    1. Oral contraceptive pill surveillance (Primary)  -     drospirenone-ethinyl estradiol (JO,GIANVI)  3-0.02 MG per tablet; Take 1 tablet by mouth Daily.  Dispense: 28 tablet; Refill: 12    2. Excessive and frequent menstruation with regular cycle  -     drospirenone-ethinyl estradiol (JO,GIANVI) 3-0.02 MG per tablet; Take 1 tablet by mouth Daily.  Dispense: 28 tablet; Refill: 12    3. Routine screening for STI (sexually transmitted infection)  -     Chlamydia trachomatis, Neisseria gonorrhoeae, Trichomonas vaginalis, PCR - Urine, Urine, Clean Catch      Continue OCP daily as prescribed. Reminded what to do if dose is missed.     I will call with STI testing results and Rx PRN. Encouraged safe sex practices.     RTC annually for refills or sooner PRN. Will do WWE and pap testing next year at 20yo.

## 2021-07-21 RX ORDER — FLUOXETINE HYDROCHLORIDE 40 MG/1
CAPSULE ORAL
Qty: 30 CAPSULE | Refills: 5 | Status: SHIPPED | OUTPATIENT
Start: 2021-07-21 | End: 2022-01-05

## 2021-08-02 ENCOUNTER — CLINICAL SUPPORT (OUTPATIENT)
Dept: FAMILY MEDICINE CLINIC | Facility: CLINIC | Age: 21
End: 2021-08-02

## 2021-08-02 DIAGNOSIS — Z23 NEED FOR VACCINATION: Primary | ICD-10-CM

## 2021-08-02 PROCEDURE — 90471 IMMUNIZATION ADMIN: CPT | Performed by: FAMILY MEDICINE

## 2021-08-02 PROCEDURE — 90746 HEPB VACCINE 3 DOSE ADULT IM: CPT | Performed by: FAMILY MEDICINE

## 2021-08-16 ENCOUNTER — LAB (OUTPATIENT)
Dept: LAB | Facility: OTHER | Age: 21
End: 2021-08-16

## 2021-08-16 PROCEDURE — 87635 SARS-COV-2 COVID-19 AMP PRB: CPT | Performed by: NURSE PRACTITIONER

## 2021-11-23 DIAGNOSIS — K21.9 GASTROESOPHAGEAL REFLUX DISEASE WITHOUT ESOPHAGITIS: ICD-10-CM

## 2021-11-23 RX ORDER — OMEPRAZOLE 40 MG/1
CAPSULE, DELAYED RELEASE ORAL
Qty: 30 CAPSULE | Refills: 0 | Status: SHIPPED | OUTPATIENT
Start: 2021-11-23 | End: 2022-01-05

## 2021-12-29 DIAGNOSIS — R00.2 PALPITATIONS: Primary | ICD-10-CM

## 2022-01-05 DIAGNOSIS — K21.9 GASTROESOPHAGEAL REFLUX DISEASE WITHOUT ESOPHAGITIS: ICD-10-CM

## 2022-01-05 RX ORDER — OMEPRAZOLE 40 MG/1
CAPSULE, DELAYED RELEASE ORAL
Qty: 28 CAPSULE | Refills: 0 | Status: SHIPPED | OUTPATIENT
Start: 2022-01-05 | End: 2022-02-03

## 2022-01-05 RX ORDER — FLUOXETINE HYDROCHLORIDE 40 MG/1
CAPSULE ORAL
Qty: 28 CAPSULE | Refills: 0 | Status: SHIPPED | OUTPATIENT
Start: 2022-01-05 | End: 2022-02-03

## 2022-02-03 DIAGNOSIS — K21.9 GASTROESOPHAGEAL REFLUX DISEASE WITHOUT ESOPHAGITIS: ICD-10-CM

## 2022-02-03 RX ORDER — FLUOXETINE HYDROCHLORIDE 40 MG/1
CAPSULE ORAL
Qty: 28 CAPSULE | Refills: 0 | Status: SHIPPED | OUTPATIENT
Start: 2022-02-03 | End: 2022-03-03

## 2022-02-03 RX ORDER — OMEPRAZOLE 40 MG/1
CAPSULE, DELAYED RELEASE ORAL
Qty: 28 CAPSULE | Refills: 0 | Status: SHIPPED | OUTPATIENT
Start: 2022-02-03 | End: 2022-03-03

## 2022-03-03 ENCOUNTER — TELEPHONE (OUTPATIENT)
Dept: CARDIOLOGY | Facility: CLINIC | Age: 22
End: 2022-03-03

## 2022-03-03 DIAGNOSIS — K21.9 GASTROESOPHAGEAL REFLUX DISEASE WITHOUT ESOPHAGITIS: ICD-10-CM

## 2022-03-03 RX ORDER — OMEPRAZOLE 40 MG/1
CAPSULE, DELAYED RELEASE ORAL
Qty: 14 CAPSULE | Refills: 0 | Status: SHIPPED | OUTPATIENT
Start: 2022-03-03 | End: 2022-04-07

## 2022-03-03 RX ORDER — FLUOXETINE HYDROCHLORIDE 40 MG/1
CAPSULE ORAL
Qty: 14 CAPSULE | Refills: 0 | Status: SHIPPED | OUTPATIENT
Start: 2022-03-03 | End: 2022-04-07

## 2022-03-03 NOTE — TELEPHONE ENCOUNTER
Left generic vmail    Called pt to confirm the location of where the monitor will be placed. Pt is scheduled for 1pm, but [place] location will not be available; pt needs to be aware that placement will take place in [place]

## 2022-04-07 DIAGNOSIS — K21.9 GASTROESOPHAGEAL REFLUX DISEASE WITHOUT ESOPHAGITIS: ICD-10-CM

## 2022-04-07 RX ORDER — FLUOXETINE HYDROCHLORIDE 40 MG/1
CAPSULE ORAL
Qty: 14 CAPSULE | Refills: 0 | Status: SHIPPED | OUTPATIENT
Start: 2022-04-07 | End: 2022-06-13

## 2022-04-07 RX ORDER — OMEPRAZOLE 40 MG/1
CAPSULE, DELAYED RELEASE ORAL
Qty: 14 CAPSULE | Refills: 0 | Status: SHIPPED | OUTPATIENT
Start: 2022-04-07 | End: 2022-06-13

## 2022-05-07 DIAGNOSIS — K21.9 GASTROESOPHAGEAL REFLUX DISEASE WITHOUT ESOPHAGITIS: ICD-10-CM

## 2022-05-09 RX ORDER — FLUOXETINE HYDROCHLORIDE 40 MG/1
CAPSULE ORAL
Qty: 14 CAPSULE | Refills: 0 | OUTPATIENT
Start: 2022-05-09

## 2022-05-09 RX ORDER — OMEPRAZOLE 40 MG/1
CAPSULE, DELAYED RELEASE ORAL
Qty: 14 CAPSULE | Refills: 0 | OUTPATIENT
Start: 2022-05-09

## 2022-06-13 DIAGNOSIS — K21.9 GASTROESOPHAGEAL REFLUX DISEASE WITHOUT ESOPHAGITIS: ICD-10-CM

## 2022-06-13 RX ORDER — OMEPRAZOLE 40 MG/1
CAPSULE, DELAYED RELEASE ORAL
Qty: 14 CAPSULE | Refills: 0 | Status: SHIPPED | OUTPATIENT
Start: 2022-06-13 | End: 2022-06-14 | Stop reason: SDUPTHER

## 2022-06-13 RX ORDER — FLUOXETINE HYDROCHLORIDE 40 MG/1
CAPSULE ORAL
Qty: 14 CAPSULE | Refills: 0 | Status: SHIPPED | OUTPATIENT
Start: 2022-06-13 | End: 2022-06-14 | Stop reason: SDUPTHER

## 2022-06-14 ENCOUNTER — OFFICE VISIT (OUTPATIENT)
Dept: FAMILY MEDICINE CLINIC | Facility: CLINIC | Age: 22
End: 2022-06-14

## 2022-06-14 VITALS
DIASTOLIC BLOOD PRESSURE: 60 MMHG | WEIGHT: 102 LBS | HEART RATE: 68 BPM | SYSTOLIC BLOOD PRESSURE: 108 MMHG | HEIGHT: 64 IN | OXYGEN SATURATION: 98 % | TEMPERATURE: 97.7 F | BODY MASS INDEX: 17.42 KG/M2

## 2022-06-14 DIAGNOSIS — F41.9 ANXIETY: ICD-10-CM

## 2022-06-14 DIAGNOSIS — K21.9 GASTROESOPHAGEAL REFLUX DISEASE WITHOUT ESOPHAGITIS: ICD-10-CM

## 2022-06-14 DIAGNOSIS — G43.109 MIGRAINE WITH AURA AND WITHOUT STATUS MIGRAINOSUS, NOT INTRACTABLE: Primary | ICD-10-CM

## 2022-06-14 PROCEDURE — 99214 OFFICE O/P EST MOD 30 MIN: CPT | Performed by: FAMILY MEDICINE

## 2022-06-14 RX ORDER — OMEPRAZOLE 40 MG/1
40 CAPSULE, DELAYED RELEASE ORAL DAILY
Qty: 30 CAPSULE | Refills: 5 | Status: SHIPPED | OUTPATIENT
Start: 2022-06-14 | End: 2022-11-08

## 2022-06-14 RX ORDER — FLUOXETINE HYDROCHLORIDE 40 MG/1
40 CAPSULE ORAL DAILY
Qty: 30 CAPSULE | Refills: 5 | Status: SHIPPED | OUTPATIENT
Start: 2022-06-14 | End: 2022-11-08

## 2022-06-14 NOTE — PROGRESS NOTES
Subjective   Mercy Keenan is a 21 y.o. female.   Here in the office today after severe headache a few days ago.  She had a similar 1 but is after a wisdom tooth was extracted.  This time it came on rather suddenly at work.  She had some visual distortion for about 15 minutes and then the headache came on.  She said the visual distortion was like what happens when you rub your eyes real hard for a few seconds and then open them.  The headache was associate with nausea and vomiting.  It lasted for about an hour.  It was bilateral but the right side hurt worse than the left.  It was throbbing and painful.  She did not have any photophobia with it though.  Also she would like to get a refill of her Prozac.  This helped significantly for the anxiety.  She is also on Prilosec for reflux which needs refilled.  History of Present Illness    The following portions of the patient's history were reviewed and updated as appropriate: allergies, current medications, past family history, past medical history, past social history, past surgical history and problem list.    Review of Systems   Constitutional: Negative.    Respiratory: Negative.  Negative for shortness of breath.    Cardiovascular: Negative.  Negative for chest pain.   Gastrointestinal: Positive for nausea.   Musculoskeletal: Negative.  Negative for myalgias.   Skin: Negative.    Allergic/Immunologic: Negative for immunocompromised state.   Neurological: Positive for headaches. Negative for dizziness, tremors, seizures, syncope, weakness and numbness.   Hematological: Negative.    Psychiatric/Behavioral: Negative for agitation, confusion, dysphoric mood and sleep disturbance. The patient is not nervous/anxious.    All other systems reviewed and are negative.      Objective   Physical Exam  Vitals and nursing note reviewed.   Constitutional:       Appearance: She is well-developed.   HENT:      Head: Normocephalic and atraumatic.      Nose: Nose normal.    Eyes:      Conjunctiva/sclera: Conjunctivae normal.      Pupils: Pupils are equal, round, and reactive to light.   Neck:      Thyroid: No thyromegaly.      Vascular: No JVD.      Trachea: No tracheal deviation.   Cardiovascular:      Rate and Rhythm: Normal rate and regular rhythm.      Heart sounds: Normal heart sounds. No murmur heard.  Pulmonary:      Effort: Pulmonary effort is normal.      Breath sounds: Normal breath sounds. No wheezing.   Abdominal:      General: Bowel sounds are normal. There is no distension.      Palpations: Abdomen is soft.      Tenderness: There is no abdominal tenderness.   Musculoskeletal:         General: Normal range of motion.      Cervical back: Normal range of motion and neck supple.   Lymphadenopathy:      Cervical: No cervical adenopathy.   Skin:     General: Skin is warm and dry.      Findings: No rash.   Neurological:      General: No focal deficit present.      Mental Status: She is alert and oriented to person, place, and time. Mental status is at baseline.      Cranial Nerves: No cranial nerve deficit.      Sensory: No sensory deficit.      Motor: No weakness.      Coordination: Coordination normal.      Gait: Gait normal.      Deep Tendon Reflexes: Reflexes normal.   Psychiatric:         Mood and Affect: Mood normal.         Behavior: Behavior normal.         Thought Content: Thought content normal.         Judgment: Judgment normal.         Assessment & Plan   Diagnoses and all orders for this visit:    1. Migraine with aura and without status migrainosus, not intractable (Primary)    2. Gastroesophageal reflux disease without esophagitis  -     omeprazole (priLOSEC) 40 MG capsule; Take 1 capsule by mouth Daily.  Dispense: 30 capsule; Refill: 5    3. Anxiety  -     FLUoxetine (PROzac) 40 MG capsule; Take 1 capsule by mouth Daily.  Dispense: 30 capsule; Refill: 5    Suspect that she has had a migraine.  If she has another headache will pursue further work-up such as MRI.   Gave samples of Nurtec ODT for her to use with instructions for use.  Contact me for any more headaches to start further work-up.    Continue Prozac for GERD    Continue fluoxetine for anxiety.        This document has been electronically signed by Betty Gautam MD on June 14, 2022 15:38 CDT

## 2022-07-06 PROCEDURE — 87635 SARS-COV-2 COVID-19 AMP PRB: CPT | Performed by: NURSE PRACTITIONER

## 2022-07-07 ENCOUNTER — LAB (OUTPATIENT)
Dept: LAB | Facility: OTHER | Age: 22
End: 2022-07-07

## 2022-07-20 ENCOUNTER — OFFICE VISIT (OUTPATIENT)
Dept: OBSTETRICS AND GYNECOLOGY | Facility: CLINIC | Age: 22
End: 2022-07-20

## 2022-07-20 VITALS
HEIGHT: 64 IN | DIASTOLIC BLOOD PRESSURE: 60 MMHG | HEART RATE: 97 BPM | SYSTOLIC BLOOD PRESSURE: 98 MMHG | BODY MASS INDEX: 17.48 KG/M2 | WEIGHT: 102.4 LBS

## 2022-07-20 DIAGNOSIS — N92.0 EXCESSIVE AND FREQUENT MENSTRUATION WITH REGULAR CYCLE: ICD-10-CM

## 2022-07-20 DIAGNOSIS — B37.9 ANTIBIOTIC-INDUCED YEAST INFECTION: Primary | ICD-10-CM

## 2022-07-20 DIAGNOSIS — Z30.41 ORAL CONTRACEPTIVE PILL SURVEILLANCE: ICD-10-CM

## 2022-07-20 DIAGNOSIS — T36.95XA ANTIBIOTIC-INDUCED YEAST INFECTION: Primary | ICD-10-CM

## 2022-07-20 PROCEDURE — 99213 OFFICE O/P EST LOW 20 MIN: CPT | Performed by: NURSE PRACTITIONER

## 2022-07-20 RX ORDER — FLUCONAZOLE 150 MG/1
150 TABLET ORAL ONCE
Qty: 2 TABLET | Refills: 0 | Status: SHIPPED | OUTPATIENT
Start: 2022-07-20 | End: 2022-07-20

## 2022-07-20 RX ORDER — DROSPIRENONE AND ETHINYL ESTRADIOL 0.02-3(28)
1 KIT ORAL DAILY
Qty: 28 TABLET | Refills: 1 | Status: SHIPPED | OUTPATIENT
Start: 2022-07-20 | End: 2022-09-09 | Stop reason: SDUPTHER

## 2022-07-20 NOTE — PROGRESS NOTES
Subjective   Mercy Keenan is a 21 y.o. female.     History of Present Illness   Pt presents for WWE but is menstruating and has complaints of vaginitis. Pt is due for OCP refills as well.     Patient's last menstrual period was 07/17/2022 (approximate). Periods are 5 days, regular, moderate flow and cramping. Does well on Laverne. Denies missing any pills. Wishes to continue use.     She also complains of previously thick white discharge (prior to period) and vaginal itching after completed cefdinir for respiratory infection. She is on day 2 or 3 OTC monistat treatment and has had some mild relief.     The following portions of the patient's history were reviewed and updated as appropriate: allergies, current medications, past family history, past medical history, past social history, past surgical history and problem list.    Review of Systems   Constitutional: Negative.  Negative for chills and fever.   Respiratory: Negative.    Cardiovascular: Negative.    Gastrointestinal: Positive for nausea and indigestion.        In the last few days, while on antibiotics    Genitourinary: Positive for vaginal bleeding (now menstruating), vaginal discharge (thick white) and vaginal pain (itching). Negative for difficulty urinating, dysuria, frequency, genital sores, hematuria, menstrual problem, pelvic pain and urgency.   Neurological: Negative for dizziness, syncope, light-headedness and headache.       Objective   Physical Exam  Vitals reviewed.   Constitutional:       Appearance: Normal appearance. She is underweight.   Cardiovascular:      Rate and Rhythm: Normal rate and regular rhythm.      Heart sounds: Normal heart sounds.   Pulmonary:      Effort: Pulmonary effort is normal.      Breath sounds: Normal breath sounds.   Genitourinary:     Comments: Exam deferred since wet prep and thinprep would be obscured by bleeding   Neurological:      Mental Status: She is alert and oriented to person, place, and time.    Psychiatric:         Mood and Affect: Mood normal.         Behavior: Behavior normal.           Assessment & Plan   Diagnoses and all orders for this visit:    1. Antibiotic-induced yeast infection (Primary)  -     fluconazole (DIFLUCAN) 150 MG tablet; Take 1 tablet by mouth 1 (One) Time for 1 dose. Repeat dose in 72 hours if still symptomatic  Dispense: 2 tablet; Refill: 0    2. Excessive and frequent menstruation with regular cycle  -     drospirenone-ethinyl estradiol (JO,GIANVI) 3-0.02 MG per tablet; Take 1 tablet by mouth Daily.  Dispense: 28 tablet; Refill: 1    3. Oral contraceptive pill surveillance  -     drospirenone-ethinyl estradiol (JO,GIANVI) 3-0.02 MG per tablet; Take 1 tablet by mouth Daily.  Dispense: 28 tablet; Refill: 1    I will empirically treat for yeast given recent antibiotics and mild improvement with monistat. Take diflucan today and repeat in 3 days. RTC in 2 weeks for WWE and we will check for any continued concerns then. And rule out STI then as well. Pt had already urinated prior to visit, so specimen couldn't be collected.     Pt is doing well on OCP. Continue taking daily as prescribed. Pt voices understanding of what to do if dose is missed.

## 2022-08-03 RX ORDER — METHYLPREDNISOLONE 4 MG/1
TABLET ORAL
Qty: 21 TABLET | Refills: 0 | Status: SHIPPED | OUTPATIENT
Start: 2022-08-03 | End: 2022-09-09

## 2022-09-09 ENCOUNTER — OFFICE VISIT (OUTPATIENT)
Dept: OBSTETRICS AND GYNECOLOGY | Facility: CLINIC | Age: 22
End: 2022-09-09

## 2022-09-09 ENCOUNTER — LAB (OUTPATIENT)
Dept: LAB | Facility: OTHER | Age: 22
End: 2022-09-09

## 2022-09-09 VITALS
DIASTOLIC BLOOD PRESSURE: 60 MMHG | WEIGHT: 101.8 LBS | BODY MASS INDEX: 17.38 KG/M2 | HEIGHT: 64 IN | SYSTOLIC BLOOD PRESSURE: 98 MMHG | HEART RATE: 81 BPM

## 2022-09-09 DIAGNOSIS — Z30.41 ORAL CONTRACEPTIVE PILL SURVEILLANCE: ICD-10-CM

## 2022-09-09 DIAGNOSIS — Z01.419 ENCOUNTER FOR GYNECOLOGICAL EXAMINATION WITH PAPANICOLAOU SMEAR OF CERVIX: Primary | ICD-10-CM

## 2022-09-09 DIAGNOSIS — Z11.3 ROUTINE SCREENING FOR STI (SEXUALLY TRANSMITTED INFECTION): ICD-10-CM

## 2022-09-09 PROCEDURE — 2014F MENTAL STATUS ASSESS: CPT | Performed by: NURSE PRACTITIONER

## 2022-09-09 PROCEDURE — 99395 PREV VISIT EST AGE 18-39: CPT | Performed by: NURSE PRACTITIONER

## 2022-09-09 PROCEDURE — 3008F BODY MASS INDEX DOCD: CPT | Performed by: NURSE PRACTITIONER

## 2022-09-09 RX ORDER — DROSPIRENONE AND ETHINYL ESTRADIOL 0.02-3(28)
1 KIT ORAL DAILY
Qty: 28 TABLET | Refills: 12 | Status: SHIPPED | OUTPATIENT
Start: 2022-09-09

## 2022-09-09 NOTE — PROGRESS NOTES
Subjective   Chief Complaint   Patient presents with   • Gynecologic Exam     WWE     • Contraception     Refill on OCP       Mercy Keenan is a 21 y.o. year old  presenting to be seen for her annual exam.  Today she here for a WWE with pap as well as an OCP refill. Patient has recovered from a COVID infection. She also recently had a c diff infection that was treated for and has recovered from. Patient has no recurrence of vaginitis symptoms and no new complaints or concerns.      Patient's last menstrual period was 2022 (approximate).    OB History        0    Para   0    Term   0       0    AB   0    Living   0       SAB   0    IAB   0    Ectopic   0    Molar   0    Multiple   0    Live Births   0                Current birth control method: OCP (estrogen/progesterone).    She is sexually active.  In the past 12 months there has been new sexual partners.  Condoms are not typically used.  She would like to be screened for STD's at today's exam.     Past 6 month menstrual history:    Cycle Frequency: regular, predictable and consistent every 28 - 32 days   Menstrual cycle character: flow is typically moderately heavy   Cycle Duration: 3 - 5   Number of heavy days of flows: 3   Dysmenorrhea: is not affecting her activities of daily living   PMS: is not affecting her activities of daily living   Intermenstrual bleeding present: not asked   Post-coital bleeding present: not asked     She exercises regularly: yes.  She wears her seat belt:yes.  She has concerns about domestic violence: no.  Last colonoscopy: Never   Last DEXA: Never  Last MMG: Never  Last pap: Never  History of abnormal PAP: First pap today    The following portions of the patient's history were reviewed and updated as appropriate:problem list, current medications, allergies, past family history, past medical history, past social history and past surgical history.    Social History    Tobacco Use      Smoking  "status: Never Smoker      Smokeless tobacco: Never Used    Social History     Substance and Sexual Activity   Alcohol Use No      Review of Systems   Constitutional: Negative for activity change, appetite change, chills, fatigue and fever.   Respiratory: Negative.  Negative for cough and shortness of breath.    Cardiovascular: Negative.  Negative for chest pain and leg swelling.   Gastrointestinal: Negative for abdominal pain, diarrhea, nausea and vomiting.   Genitourinary: Negative for dysuria, flank pain, frequency, pelvic pain, urgency, vaginal discharge and vaginal pain.   Neurological: Negative.  Negative for dizziness, seizures, syncope and headaches.   Psychiatric/Behavioral: Negative.          Objective   BP 98/60   Pulse 81   Ht 162.6 cm (64\")   Wt 46.2 kg (101 lb 12.8 oz)   LMP 08/14/2022 (Approximate)   Breastfeeding No   BMI 17.47 kg/m²     General:  well developed; well nourished  no acute distress  appears stated age   Skin:  No suspicious lesions seen   Thyroid: not examined   Breasts:  Examined in supine position  Symmetric without masses or skin dimpling  Nipples normal without inversion, lesions or discharge  There are no palpable axillary nodes  Nipple piercings are present bilaterally   Abdomen: soft, non-tender; no masses  no umbilical or inguinal hernias are present  no hepato-splenomegaly  Normal findings: bowel sounds normal   Cardiac: Heart sounds are normal.  Regular rate and rhythm without murmur, gallop or rub.   Resp: Normal expansion.  Clear to auscultation.  No rales, rhonchi, or wheezing.   Psych: alert,oriented, in NAD with a full range of affect, normal behavior and no psychotic features   Pelvis: Uterus:  Clinical staff was present for exam  External genitalia:  normal appearance of the external genitalia including Bartholin's and Shell's glands.  :  urethral meatus normal;  Vaginal:  normal pink mucosa without prolapse or lesions  Cervix:  normal appearance.  Uterus:  " normal size, shape and consistency  Adnexa:  normal bimanual exam of the adnexa.     Lab Review   No data reviewed    Imaging  No data reviewed       Diagnoses and all orders for this visit:    1. Encounter for gynecological examination with Papanicolaou smear of cervix (Primary)  -     LIQUID-BASED PAP SMEAR, [facility] ([facility])    2. Routine screening for STI (sexually transmitted infection)  -     CHLAMYDIA/GONORRHOEAE, [facility]) - ThinPrep Vial, Cervix    3. Oral contraceptive pill surveillance  -     drospirenone-ethinyl estradiol (JO,GIANVI) 3-0.02 MG per tablet; Take 1 tablet by mouth Daily.  Dispense: 28 tablet; Refill: 12      Pap results: I will send card in mail or call if abnormal.     STI collected on thin prep.     Pt uses OCPs as contraceptive. Refills sent.     Patient was educated about regular SBE and how to preform them.     Patient was also counseled about the important of annual WWE and regular pap smears. Informed to RTC in one year for a WWE or sooner with any new complaints.    This note was electronically signed.    Madhuri Shoemaker, APRN  September 9, 2022

## 2022-09-14 LAB — REF LAB TEST METHOD: NORMAL

## 2022-09-21 LAB — REF LAB TEST METHOD: NORMAL

## 2022-11-07 DIAGNOSIS — F41.9 ANXIETY: ICD-10-CM

## 2022-11-07 DIAGNOSIS — K21.9 GASTROESOPHAGEAL REFLUX DISEASE WITHOUT ESOPHAGITIS: ICD-10-CM

## 2022-11-08 RX ORDER — OMEPRAZOLE 40 MG/1
40 CAPSULE, DELAYED RELEASE ORAL DAILY
Qty: 30 CAPSULE | Refills: 5 | Status: SHIPPED | OUTPATIENT
Start: 2022-11-08

## 2022-11-08 RX ORDER — FLUOXETINE HYDROCHLORIDE 40 MG/1
40 CAPSULE ORAL DAILY
Qty: 30 CAPSULE | Refills: 5 | Status: SHIPPED | OUTPATIENT
Start: 2022-11-08

## 2023-05-23 DIAGNOSIS — F41.9 ANXIETY: ICD-10-CM

## 2023-05-23 DIAGNOSIS — K21.9 GASTROESOPHAGEAL REFLUX DISEASE WITHOUT ESOPHAGITIS: ICD-10-CM

## 2023-05-23 RX ORDER — OMEPRAZOLE 40 MG/1
40 CAPSULE, DELAYED RELEASE ORAL DAILY
Qty: 30 CAPSULE | Refills: 5 | Status: SHIPPED | OUTPATIENT
Start: 2023-05-23

## 2023-05-23 RX ORDER — FLUOXETINE HYDROCHLORIDE 40 MG/1
40 CAPSULE ORAL DAILY
Qty: 30 CAPSULE | Refills: 5 | Status: SHIPPED | OUTPATIENT
Start: 2023-05-23

## 2023-09-15 DIAGNOSIS — Z30.41 ORAL CONTRACEPTIVE PILL SURVEILLANCE: ICD-10-CM

## 2023-09-15 RX ORDER — DROSPIRENONE AND ETHINYL ESTRADIOL 0.02-3(28)
KIT ORAL
Qty: 28 TABLET | Refills: 1 | Status: SHIPPED | OUTPATIENT
Start: 2023-09-15

## (undated) DEVICE — SINGLE-USE BIOPSY FORCEPS: Brand: RADIAL JAW 4

## (undated) DEVICE — CANN SMPL SOFTECH BIFLO ETCO2 A/M 7FT

## (undated) DEVICE — BITEBLOCK ENDO W/STRAP 60F A/ LF DISP